# Patient Record
Sex: MALE | Race: WHITE | Employment: FULL TIME | URBAN - NONMETROPOLITAN AREA
[De-identification: names, ages, dates, MRNs, and addresses within clinical notes are randomized per-mention and may not be internally consistent; named-entity substitution may affect disease eponyms.]

---

## 2022-12-21 ENCOUNTER — HOSPITAL ENCOUNTER (INPATIENT)
Age: 27
LOS: 6 days | Discharge: HOME | DRG: 751 | End: 2022-12-27
Attending: PSYCHIATRY & NEUROLOGY | Admitting: PSYCHIATRY & NEUROLOGY
Payer: MEDICAID

## 2022-12-21 PROBLEM — F33.2 MDD (MAJOR DEPRESSIVE DISORDER), RECURRENT SEVERE, WITHOUT PSYCHOSIS (HCC): Status: ACTIVE | Noted: 2022-12-21

## 2022-12-21 PROBLEM — F33.0 MDD (MAJOR DEPRESSIVE DISORDER), RECURRENT EPISODE, MILD (HCC): Status: ACTIVE | Noted: 2022-12-21

## 2022-12-21 PROCEDURE — 6370000000 HC RX 637 (ALT 250 FOR IP): Performed by: PSYCHIATRY & NEUROLOGY

## 2022-12-21 PROCEDURE — APPSS30 APP SPLIT SHARED TIME 16-30 MINUTES: Performed by: PHYSICIAN ASSISTANT

## 2022-12-21 PROCEDURE — 1240000000 HC EMOTIONAL WELLNESS R&B

## 2022-12-21 RX ORDER — ESCITALOPRAM OXALATE 10 MG/1
5 TABLET ORAL DAILY
Status: DISCONTINUED | OUTPATIENT
Start: 2022-12-21 | End: 2022-12-27 | Stop reason: HOSPADM

## 2022-12-21 RX ORDER — ACETAMINOPHEN 325 MG/1
650 TABLET ORAL EVERY 4 HOURS PRN
Status: DISCONTINUED | OUTPATIENT
Start: 2022-12-21 | End: 2022-12-27 | Stop reason: HOSPADM

## 2022-12-21 RX ORDER — HYDROXYZINE HYDROCHLORIDE 25 MG/1
50 TABLET, FILM COATED ORAL 3 TIMES DAILY PRN
Status: DISCONTINUED | OUTPATIENT
Start: 2022-12-21 | End: 2022-12-27 | Stop reason: HOSPADM

## 2022-12-21 RX ORDER — MAGNESIUM HYDROXIDE/ALUMINUM HYDROXICE/SIMETHICONE 120; 1200; 1200 MG/30ML; MG/30ML; MG/30ML
30 SUSPENSION ORAL EVERY 6 HOURS PRN
Status: DISCONTINUED | OUTPATIENT
Start: 2022-12-21 | End: 2022-12-27 | Stop reason: HOSPADM

## 2022-12-21 RX ORDER — TRAZODONE HYDROCHLORIDE 50 MG/1
50 TABLET ORAL NIGHTLY PRN
Status: DISCONTINUED | OUTPATIENT
Start: 2022-12-21 | End: 2022-12-27 | Stop reason: HOSPADM

## 2022-12-21 RX ORDER — IBUPROFEN 400 MG/1
400 TABLET ORAL EVERY 6 HOURS PRN
Status: DISCONTINUED | OUTPATIENT
Start: 2022-12-21 | End: 2022-12-27 | Stop reason: HOSPADM

## 2022-12-21 RX ORDER — NICOTINE 21 MG/24HR
1 PATCH, TRANSDERMAL 24 HOURS TRANSDERMAL DAILY
Status: DISCONTINUED | OUTPATIENT
Start: 2022-12-21 | End: 2022-12-27 | Stop reason: HOSPADM

## 2022-12-21 RX ADMIN — ESCITALOPRAM OXALATE 5 MG: 10 TABLET ORAL at 11:41

## 2022-12-21 ASSESSMENT — LIFESTYLE VARIABLES
HOW OFTEN DO YOU HAVE A DRINK CONTAINING ALCOHOL: 2-4 TIMES A MONTH
HOW MANY STANDARD DRINKS CONTAINING ALCOHOL DO YOU HAVE ON A TYPICAL DAY: 1 OR 2

## 2022-12-21 ASSESSMENT — PATIENT HEALTH QUESTIONNAIRE - PHQ9: SUM OF ALL RESPONSES TO PHQ QUESTIONS 1-9: 15

## 2022-12-21 ASSESSMENT — PAIN SCALES - GENERAL: PAINLEVEL_OUTOF10: 0

## 2022-12-21 ASSESSMENT — SLEEP AND FATIGUE QUESTIONNAIRES
DO YOU USE A SLEEP AID: NO
DO YOU HAVE DIFFICULTY SLEEPING: NO
AVERAGE NUMBER OF SLEEP HOURS: 14

## 2022-12-21 NOTE — PLAN OF CARE
Patient has attended 2 of the groups today and has also been out of his room for social interaction with others this shift so he has been able to demonstrate effective coping strategies at this time.

## 2022-12-21 NOTE — BH NOTE
Group Therapy Note    Date: 12/21/2022  Start Time:  1000  End Time:   1020  Number of Participants: 3    Type of Group: Recreational    Patient's Goal:  Increase wellness. Notes:  Patient participated in a wellness worksheet and shared his answers with others in the group.     Status After Intervention:  Improved    Participation Level: Interactive    Participation Quality: Appropriate, Attentive, and Sharing      Speech:  normal      Thought Process/Content: Logical      Affective Functioning: Congruent      Mood: euthymic      Level of consciousness:  Oriented x4      Response to Learning: Progressing to goal      Endings: None Reported    Modes of Intervention: Education, Support, Socialization, Exploration, and Activity      Discipline Responsible: Psychoeducational Specialist      Signature:  Minh Walden

## 2022-12-21 NOTE — PROGRESS NOTES
Behavioral Health   Admission Note   Ad[facility]    Reason for Admission: \"Having suicidal thoughts for the last week\"    Patient Strengths/Barriers  Strengths (Must Choose Two): Independent living, Support from family, Support from friends  Barriers: Recreational/leisure/hobbies    Addictive Behavior  In the Past 3 Months, Have You Felt or Has Someone Told You That You Have a Problem With  : None    Medical Problems:   History reviewed. No pertinent past medical history. Status EXAM:  Mental Status and Behavioral Exam  Normal: No  Level of Assistance: Independent/Self  Facial Expression: Flat, Sad  Affect: Congruent  Level of Consciousness: Alert  Frequency of Checks: 4 times per hour, close  Mood:Normal: No  Mood: Depressed, Sad  Motor Activity:Normal: Yes  Eye Contact: Fair  Observed Behavior: Cooperative  Sexual Misconduct History: Current - no  Preception: [place] to person, [place] to time, [place] to place, [place] to situation  Attention:Normal: No  Attention: Distractible  Thought Processes: Circumstantial  Thought Content:Normal: No  Thought Content: Paranoia (generalized)  Depression Symptoms: Appetite change, Change in energy level, Feelings of hopelessess, Feelings of worthlessness, Feelings of helplessness  Anxiety Symptoms: No problems reported or observed. Nichol Symptoms: No problems reported or observed. Hallucinations: None  Delusions: Yes  Delusions: Paranoid  Memory:Normal: Yes  Insight and Judgment: No  Insight and Judgment: Poor judgment, Poor insight    Pt admitted with followings belongings:  Dental Appliances: None  Vision - Corrective Lenses: Eyeglasses  Hearing Aid: None  Jewelry: None  Body Piercings Removed: N/A  Clothing: Belt, Footwear, Jacket/Coat, Pants, Shirt, Undergarments, Socks (belt, hoodie, boots, jeans, socks, underpants)  Other Valuables: Money, Wallet ($1 cash, assorted cards)     Admission order obtained Yes  Belongings sent home withn/a.  Valuables placed in safe in security envelope, number:  n/a. Patient's home medications were n/a. Patient oriented to surroundings and program expectations and copy of patient rights given. Received admission packet:  Yes  Consents reviewed, signed Yes. Outcomes Questionnaire completed Refused. \"An Important Message from [employer] About Your Rights\" form reviewed, signed: N/A . Patient verbalize understanding: Yes. Patient informed of 15 minute safety monitoring: YES/NO/NA: yes          Patient screened positive for suicide risk on CSSR-S (\"yes\" to question #4, 5, OR 6)  no N/a. Physician notified of risk score yes  Constant Observer Orders received N/A .   2 person skin assessment completed upon admission declined. Explained patients right to have family, representative or physician notified of their admission. Patient has Declined for physician to be notified. Patient has Declined for family/representative to be notified. Provided pt with [employer] handout entitled \"Quitting Smoking. \"  Reviewed handout with pt addressing dangers of smoking, developing coping skills, and providing basic information about quitting. Pt response to counseling:  declined    Admission summary: Patient arrived to  accompanied by EMT and campus police. Patient [facility]. Per patient he has been feeling increasingly depressed and suicidal for the last week. Patient reports feeling hopeless and helpless \" functioning on auto pillet getting up going to work and not much else. \" Patient denies and one event contributing to his depression but reports his mental health problems started \" about a year ago\". Patient oriented to room and unit, denies additional cares at this time.            Akash Rasmussen RN
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
## 2022-12-21 NOTE — PROGRESS NOTES
Group Therapy Note    Date: 12/21/2022  Start Time: 1330  End Time:  1440  Number of Participants: 4    Type of Group: Psychotherapy      Notes:  Pt is present for group. Peers explored their personal experiences with self will vs acceptance and willingness to accept available help. Each group member explore various defense mechanisms which they had learned to disconnect their head from their heart and how those skills are no longer adaptive or helpful. The group was introduced to pro active vs reactive stress management. Pt identified he being \"overly analytical  \" which he had learned in response to \"emotional trauma. \"    Status After Intervention:  Improved    Participation Level:  Active Listener and Interactive    Participation Quality: Appropriate, Attentive, Sharing, and Supportive      Speech:  normal      Thought Process/Content: Logical. Arrogance       Affective Functioning: Congruent      Mood: euthymic      Level of consciousness:  Alert, Oriented x4, and Attentive      Response to Learning: Able to verbalize current knowledge/experience, Able to verbalize/acknowledge new learning, Able to retain information, Capable of insight, Able to change behavior, and Progressing to goal      Endings: None Reported    Modes of Intervention: Education, Support, Socialization, Exploration, Clarifying, and Activity      Discipline Responsible: /Counselor      Signature:  Tatiana Hodges
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
## 2022-12-21 NOTE — PROGRESS NOTES
Behavioral Services  Medicare Certification Upon Admission    I certify that this patient's inpatient psychiatric hospital admission is medically necessary for:    [x] (1) Treatment which could reasonably be expected to improve this patient's condition,       [x] (2) Or for diagnostic study;     AND     [x](2) The inpatient psychiatric services are provided while the individual is under the care of a physician and are included in the individualized plan of care.     Estimated length of stay/service 3-5 days    Plan for post-hospital care hc    Electronically signed by Grace Hernandez MD on 12/21/2022 at 9:37 AM

## 2022-12-22 PROCEDURE — 1240000000 HC EMOTIONAL WELLNESS R&B

## 2022-12-22 PROCEDURE — APPSS30 APP SPLIT SHARED TIME 16-30 MINUTES: Performed by: PHYSICIAN ASSISTANT

## 2022-12-22 PROCEDURE — 6370000000 HC RX 637 (ALT 250 FOR IP): Performed by: PSYCHIATRY & NEUROLOGY

## 2022-12-22 RX ADMIN — ESCITALOPRAM OXALATE 5 MG: 10 TABLET ORAL at 08:27

## 2022-12-22 RX ADMIN — HYDROXYZINE HYDROCHLORIDE 50 MG: 25 TABLET, FILM COATED ORAL at 02:40

## 2022-12-22 ASSESSMENT — PAIN DESCRIPTION - ORIENTATION: ORIENTATION: LOWER

## 2022-12-22 ASSESSMENT — PAIN DESCRIPTION - PAIN TYPE: TYPE: ACUTE PAIN

## 2022-12-22 ASSESSMENT — PAIN SCALES - GENERAL: PAINLEVEL_OUTOF10: 1

## 2022-12-22 ASSESSMENT — PAIN DESCRIPTION - DESCRIPTORS: DESCRIPTORS: ACHING

## 2022-12-22 ASSESSMENT — PAIN - FUNCTIONAL ASSESSMENT: PAIN_FUNCTIONAL_ASSESSMENT: ACTIVITIES ARE NOT PREVENTED

## 2022-12-22 ASSESSMENT — PAIN DESCRIPTION - FREQUENCY: FREQUENCY: INTERMITTENT

## 2022-12-22 ASSESSMENT — PAIN DESCRIPTION - LOCATION: LOCATION: BACK

## 2022-12-22 NOTE — PROGRESS NOTES
Discharge Planning- Giovanni has appointmetn with Isi Napoles at [facility] on Tuesday Manoj 10 @ 1 pm

## 2022-12-22 NOTE — PLAN OF CARE
Patient has not attended any of the groups so far today and has not been out of his room to socialize with others this shift so he has not been able to demonstrate effective coping strategies at this time. Problem: Coping  Goal: Pt/Family able to verbalize concerns and demonstrate effective coping strategies  12/22/2022 1112 by Eduardo Gooden  Outcome: Not Progressing  12/21/2022 2322 by Aldo Banks RN  Outcome: Progressing  Flowsheets  Taken 12/21/2022 2322  Patient/family able to verbalize anxieties, fears, and concerns, and demonstrate effective coping: Assist patient/family to identify coping skills, available support systems and cultural and spiritual values  Taken 12/21/2022 2306  Patient/family able to verbalize anxieties, fears, and concerns, and demonstrate effective coping: Assist patient/family to identify coping skills, available support systems and cultural and spiritual values  Note: Patient was able to verbalize needs and concerns fairly well.

## 2022-12-22 NOTE — BH NOTE
Group Therapy Note    Date: 12/21/2022  Start Time: 2000  End Time:  2020  Number of Participants: 1    Type of Group: Wrap-Up/Relaxation    Wellness Binder Information  Module Name:  None  Session Number:  None    Patient's Goal:  To not use the brakes at the last minute    Notes:  Ongoing    Status After Intervention:  Unchanged    Participation Level: Interactive    Participation Quality: Resistant      Speech:  hesitant      Thought Process/Content: Linear      Affective Functioning: Blunted      Mood: euthymic      Level of consciousness:  Alert      Response to Learning: Able to retain information      Endings: None Reported    Modes of Intervention: Education      Discipline Responsible: Registered Nurse      Signature:   Magda Schneider RN

## 2022-12-22 NOTE — PATIENT CARE CONFERENCE
[address] [facility]  Initial Interdisciplinary Treatment Plan NOTE    Review Date & Time: 12/22/22 831    Patient was in treatment team.  See Multidisciplinary Treatment Team sheet for participants. Admission Type:   Admission Type: Involuntary [facility])    Reason for admission:  Reason for Admission: \"Having suicidal thoughts for the last week\"      Estimated Length of Stay Update:  3-5 days   Estimated Discharge Date Update: 2-4 days     Patient Strengths/Barriers  Strengths (Must Choose Two): Independent living, Support from family, Support from friends  Barriers: Recreational/leisure/hobbies  Addictive Behavior:Addictive Behavior  In the Past 3 Months, Have You Felt or Has Someone Told You That You Have a Problem With  : None  Medical Problems:History reviewed. No pertinent past medical history. EDUCATION:   Learner Progress Toward Treatment Goals: Reviewed results and recommendations of this team, Reviewed group plan and strategies, Reviewed signs, symptoms and risk of self harm and violent behavior, and Reviewed goals and plan of care    Method: Individual    Outcome: Verbalized understanding and Demonstrated Understanding    PATIENT GOALS: I want to feel normal and for the suicidal thoughts to stop. PLAN/TREATMENT RECOMMENDATIONS UPDATE:   What is the most important thing we can help you with while you are here? See above  Who is your support system? Sister and [place] you have follow-up providers? [facility]  Do you have the ability to pay for your medications? Yes  Where will you be residing when you leave the hospital? At home   Will need a return to work slip or FMLA paper completion?  Yes       GOALS UPDATE:   Time frame for Short-Term Goals: ongoing    [facility], [facility] [address]

## 2022-12-22 NOTE — PROGRESS NOTES
Department of Psychiatry  Progress Note     Chief Complaint:  Suicidal ideation with plan to shoot self    PROGRESS:  Giovanni presents to the interview room. He states he did not sleep well last night because he drank 7 cups of coffee yesterday to stay awake so he could participate in the groups and unit milieu. He reports his mood is okay today. He continues to feel depressed. He endorses fleeting suicidal thoughts but states they are not as heavy today. He denies any specific plan or intent and contracts for safety on the unit. He endorses feeling hopeless and helpless about his situation. Staff reported he slept 6 hours broken last night. He has been eating well on the unit. He has been compliant with his Lexapro. He states that upset his stomach yesterday. He was encouraged that this should go away within a few days of taking the medication consistently. He verbalized understanding. He did attend groups yesterday but has not attended any groups so far today. He has been sleeping most of the day today per staff.     Suicidal ideations: Fleeting without current plan or intent  Compliance with medications: good   Medication side effects: Stomachache  ROS: Patient has new complaints:  no  Sleep quality: 6 hours broken last night per staff  Attending groups: None today      OBJECTIVE      Medications  Current Facility-Administered Medications: acetaminophen (TYLENOL) tablet 650 mg, 650 mg, Oral, Q4H PRN  ibuprofen (ADVIL;MOTRIN) tablet 400 mg, 400 mg, Oral, Q6H PRN  hydrOXYzine HCl (ATARAX) tablet 50 mg, 50 mg, Oral, TID PRN  traZODone (DESYREL) tablet 50 mg, 50 mg, Oral, Nightly PRN  magnesium hydroxide (MILK OF MAGNESIA) 400 MG/5ML suspension 30 mL, 30 mL, Oral, Daily PRN  aluminum & magnesium hydroxide-simethicone (MAALOX) 200-200-20 MG/5ML suspension 30 mL, 30 mL, Oral, Q6H PRN  nicotine (NICODERM CQ) 14 MG/24HR 1 patch, 1 patch, TransDERmal, Daily  escitalopram (LEXAPRO) tablet 5 mg, 5 mg, Oral, Daily Physical     height is 5' 10\" (1.778 m) and weight is 274 lb (124.3 kg). His tympanic temperature is 96.4 °F (35.8 °C) (abnormal). His blood pressure is 129/62 and his pulse is 74. His respiration is 18 and oxygen saturation is 99%. No results found for: WBC, HGB, HCT, PLT, CHOL, TRIG, HDL, LDLDIRECT, ALT, AST, NA, K, CL, CREATININE, BUN, CO2, TSH, PSA, INR, GLUF, LABA1C, LABMICR       Mental Status Exam:   Level of consciousness:  awake  Appearance:  well-appearing, hospital attire, in chair, good grooming, and good hygiene  Behavior/Motor:  no abnormalities noted  Attitude toward examiner:  cooperative, attentive, and good eye contact  Speech:  spontaneous, normal rate, and normal volume  Mood: Okay per patient  Affect:  blunted  Thought processes:  linear, goal directed, and coherent  Thought content:  Denies homicidal ideation  Suicidal Ideation: Fleeting, without plan, and without intent  Delusions:  no evidence of delusions  Perceptual Disturbance:  denies any perceptual disturbance  Cognition: Patient is oriented to person, place, time and situation  Concentration: clinically adequate  Memory: intact  Insight & Judgement: fair       ASSESSMENT     MDD (major depressive disorder), recurrent severe, without psychosis ([facility] [address]   History of ADD  Cannabis use disorder, moderate  History of hallucinogenic abuse    PLAN    Patient's symptoms show minimal improvement today  Medication adjustments as discussed with the attending physician: Continue Lexapro as prescribed  Attempt to develop insight, psycho-education and supportive therapy conducted. Probable discharge: To be determined  Follow-up: [facility] outpatient, daily while on inpatient unit    Electronically signed by Janace Bumpers, PA-C on 12/22/2022 at 12:44 PM Reviewed patient's current plan of care and vital signs with nursing staff. **This report has been created using voice recognition software.  It may contain minor errors which are inherent in voice recognition technology. **                                      Psychiatry Attending Attestation     I assessed this patient and reviewed the case and plan of care with Lovette Dance, PA-C. I have reviewed the above documentation and I agree with the findings and treatment plan with the following updates. Patient reports that his mood is largely unchanged. Parents report feeling sad on and low. Reports that he tried to keep himself up by drinking excess caffeine yesterday. Reports that suicidal thoughts across his mind. He reports that he continues to feel helpless and hopeless    PLAN  Patient s symptoms   show no change  Continue same medication today and observe  Attempt to develop insight  Psycho-education conducted. Supportive Therapy conducted. Probable discharge is TBD   Follow-up TBD    Patient was evaluated by Lovette Dance, PA-C on the unit in person and I evaluated patient as Tele visit. This Virtual Visit was conducted with patient's consent. The patient is located in a state where I am licensed to provide care. Claritza Dobbins is a 28 y.o. male being evaluated by a Virtual Visit (video visit) encounter to address concerns as mentioned above. A caregiver was present in the room along with the patient. Patient is present at [address] [place] [address] and I am physically present at my home in [facility]     --Kvng Mckeon MD on 12/22/2022 at 2:09 PM    An electronic signature was used to authenticate this note. **This report has been created using voice recognition software. It may contain minor errors which are inherent in voice recognition technology. **

## 2022-12-22 NOTE — PROGRESS NOTES
Psychosocial Assessment    Current Level of Psychosocial Functioning     Independent                                 XXXX  Dependent    Minimal Assist     Comments:      Psychosocial High Risk Factors (check all that apply)    Unable to obtain meds   Chronic illness/pain    Substance abuse                                         XXX  Lack of Family Support   Financial stress   Isolation                                                            XXX  Inadequate Community Resources  Suicide attempt(s)  Not taking medications   Victim of crime   Developmental Delay  Unable to manage personal needs    Age 72 or older   Homeless  No transportation   Readmission within 30 days  Unemployment  Traumatic Event    Family/Supports identified: Sister and Franki Olivo    Sexual Orientation:  Heterosexual     Patient Strengths: Support, employment     Patient Barriers: isolation     Safety plan: ongoing    CMHC/MH history:    XXX    Plan of Care:  medication management, group/individual therapies, family meetings, psycho -education, treatment team meetings to assist with stabilization    Initial Discharge Plan:  Reconnect with [facility]     Clinical Summary:  Patient is 28year old male who presented to the ED for suicidal thoughts. Patient currently lives alone and works full-time. Patient shared that he has support through his sister and a friend. Patient shared that he used to be seen at [facility] before he stopped going to counseling a few months ago. Patient shared that he uses Marijuana and alcohol on a regular basis. Patient endorses suicidal thoughts and expressed that he wishes they would stop. Patient denies HI and AH/VH.

## 2022-12-22 NOTE — PLAN OF CARE
Problem: Self Harm/Suicidality  Goal: Will have no self-injury during hospital stay  Description: INTERVENTIONS:  1. Ensure constant observer at bedside with Q15M safety checks  2. Maintain a safe environment  3. Secure patient belongings  4. Ensure family/visitors adhere to safety recommendations  5. Ensure safety tray has been added to patient's diet order  6. Every shift and PRN: Re-assess suicidal risk via Frequent Screener    Outcome: Progressing  Flowsheets  Taken 12/21/2022 2322 by Jarad Ventura RN  Will have no self-injury during hospital stay: Maintain a safe environment  Taken 12/21/2022 2306 by Jarad Ventura RN  Will have no self-injury during hospital stay: Maintain a safe environment  Taken 12/21/2022 1102 by Judie Em RN  Will have no self-injury during hospital stay: Ensure constant observer at bedside with Q15M safety checks  Note: No self harm noted so far this shift. Patient denies any suicidal thoughts at present. Problem: Depression  Goal: Will be euthymic at discharge  Description: INTERVENTIONS:  1. Administer medication as ordered  2. Provide emotional support via 1:1 interaction with staff  3. Encourage involvement in milieu/groups/activities  4. Monitor for social isolation  Outcome: Progressing  Note: Patient denies any feelings of depression at present. Patient noted with a blunt affect and was isolative to his room. Problem: Behavior  Goal: Pt/Family maintain appropriate behavior and adhere to behavioral management agreement, if implemented  Description: INTERVENTIONS:  1. Assess patient/family's coping skills and  non-compliant behavior (including use of illegal substances)  2. Notify security of behavior or suspected illegal substances which indicate the need for search of the family and/or belongings  3. Encourage verbalization of thoughts and concerns in a socially appropriate manner  4.  Utilize positive, consistent limit setting strategies supporting safety of patient, staff and others  5. Encourage participation in the decision making process about the behavioral management agreement  6. If a visitor's behavior poses a threat to safety call refer to organization policy. 7. Initiate consult with , Psychosocial CNS, Spiritual Care as appropriate  Outcome: Progressing  Flowsheets  Taken 12/21/2022 2322 by Jayjay John RN  Patient/family maintains appropriate behavior and adheres to behavioral management agreement, if implemented: Assess patient/familys coping skills and  non-compliant behavior (including use of illegal substances)  Taken 12/21/2022 2306 by Jayjay John RN  Patient/family maintains appropriate behavior and adheres to behavioral management agreement, if implemented: Assess patient/familys coping skills and  non-compliant behavior (including use of illegal substances)  Taken 12/21/2022 1102 by Rohit March RN  Patient/family maintains appropriate behavior and adheres to behavioral management agreement, if implemented: Assess patient/familys coping skills and  non-compliant behavior (including use of illegal substances)  Note: Patient was cooperative with his assessment. Problem: Anxiety  Goal: Will report anxiety at manageable levels  Description: INTERVENTIONS:  1. Administer medication as ordered  2. Teach and rehearse alternative coping skills  3. Provide emotional support with 1:1 interaction with staff  Outcome: Progressing  Flowsheets  Taken 12/21/2022 2322 by Jayjay John RN  Will report anxiety at manageable levels: Teach and rehearse alternative coping skills  Taken 12/21/2022 2306 by Jayjay John RN  Will report anxiety at manageable levels: Teach and rehearse alternative coping skills  Taken 12/21/2022 1102 by Rohit March RN  Will report anxiety at manageable levels: Teach and rehearse alternative coping skills  Note: Patient denies any anxiety at present.      Problem: Drug Abuse/Detox  Goal: Will have no detox symptoms and will verbalize plan for changing drug-related behavior  Description: INTERVENTIONS:  1. Administer medication as ordered  2. Monitor physical status  3. Provide emotional support with 1:1 interaction with staff  4. Encourage  recovery focused treatment   Outcome: Progressing  Flowsheets  Taken 12/21/2022 2322 by Magda Schneider RN  Will have no detox symptoms and will verbalize plan for changing drug-related behavior: Monitor physical status  Taken 12/21/2022 2306 by Magda Schneider RN  Will have no detox symptoms and will verbalize plan for changing drug-related behavior: Monitor physical status  Taken 12/21/2022 1102 by Michel Wade RN  Will have no detox symptoms and will verbalize plan for changing drug-related behavior: Monitor physical status  Note: Patient denies any detox symptoms so far this shift. Problem: Sleep Disturbance  Goal: Will exhibit normal sleeping pattern  Description: INTERVENTIONS:  1. Administer medication as ordered  2. Decrease environmental stimuli, including noise, as appropriate  3. Discourage social isolation and naps during the day  Outcome: Progressing  Note: Patient states he has been sleeping more recently. Problem: Involuntary Admit  Goal: Will cooperate with staff recommendations and doctor's orders and will demonstrate appropriate behavior  Description: INTERVENTIONS:  1. Treat underlying conditions and offer medication as ordered  2. Educate regarding involuntary admission procedures and rules  3.  Contain excessive/inappropriate behavior per unit and hospital policies  Outcome: Progressing  Flowsheets  Taken 12/21/2022 2322 by Magda Schneider RN  Will cooperate with staff recommendations and doctor's orders and will demonstrate appropriate behavior: Educate regarding involuntary admission procedures and rules  Taken 12/21/2022 2306 by Magda Schneider RN  Will cooperate with staff recommendations and doctor's orders and will demonstrate appropriate behavior: Treat underlying conditions and offer medication as ordered  Taken 12/21/2022 1102 by Joss Weinberg, RN  Will cooperate with staff recommendations and doctor's orders and will demonstrate appropriate behavior: Treat underlying conditions and offer medication as ordered  Note: Patient was cooperative with care this shift. Problem: Discharge Planning  Goal: Discharge to home or other facility with appropriate resources  Outcome: Progressing  Flowsheets (Taken 12/21/2022 2322)  Discharge to home or other facility with appropriate resources: Identify barriers to discharge with patient and caregiver  Note: Patient states he will return home alone at discharge and continue to follow with [facility]. Problem: Risk for Elopement  Goal: Patient will not exit the unit/facility without proper excort  Outcome: Progressing  Flowsheets  Taken 12/21/2022 2322 by Deidre Thomas RN  Nursing Interventions for Elopement Risk:   Make sure patient has all necessary personal care items   Reduce environmental triggers  Taken 12/21/2022 2306 by Deidre Thomas RN  Nursing Interventions for Elopement Risk:   Make sure patient has all necessary personal care items   Reduce environmental triggers  Taken 12/21/2022 1102 by Joss Weinberg, RN  Nursing Interventions for Elopement Risk:   Make sure patient has all necessary personal care items   Reduce environmental triggers  Taken 12/21/2022 1048 by Joss Weinberg, RN  Nursing Interventions for Elopement Risk:   Make sure patient has all necessary personal care items   Reduce environmental triggers  Note: No elopement attempts noted so far this shift. Problem: Coping  Goal: Pt/Family able to verbalize concerns and demonstrate effective coping strategies  Description: INTERVENTIONS:  1. Assist patient/family to identify coping skills, available support systems and cultural and spiritual values  2.  Provide emotional support, including active listening and acknowledgement of concerns of patient and caregivers  3. Reduce environmental stimuli, as able  4. Instruct patient/family in relaxation techniques, as appropriate  5. Assess for spiritual pain/suffering and initiate Spiritual Care, Psychosocial Clinical Specialist consults as needed  12/21/2022 2322 by Manohar Whittaker RN  Outcome: Progressing  Flowsheets  Taken 12/21/2022 2322  Patient/family able to verbalize anxieties, fears, and concerns, and demonstrate effective coping: Assist patient/family to identify coping skills, available support systems and cultural and spiritual values  Taken 12/21/2022 2306  Patient/family able to verbalize anxieties, fears, and concerns, and demonstrate effective coping: Assist patient/family to identify coping skills, available support systems and cultural and spiritual values  Note: Patient was able to verbalize needs and concerns fairly well.  12/21/2022 1352 by Sherice Ocampo  Outcome: Progressing   Care plan reviewed with patient.   Patient does verbalize understanding of the plan of care and does contribute to goal setting

## 2022-12-22 NOTE — PLAN OF CARE
Problem: Self Harm/Suicidality  Goal: Will have no self-injury during hospital stay  Description: INTERVENTIONS:  1. Ensure constant observer at bedside with Q15M safety checks  2. Maintain a safe environment  3. Secure patient belongings  4. Ensure family/visitors adhere to safety recommendations  5. Ensure safety tray has been added to patient's diet order  6. Every shift and PRN: Re-assess suicidal risk via Frequent Screener    12/22/2022 1314 by González Mo RN  Outcome: Progressing  Flowsheets (Taken 12/22/2022 0745)  Will have no self-injury during hospital stay: Maintain a safe environment  Note: Free from self harming behaviors. Denies suicidal thoughts. Problem: Depression  Goal: Will be euthymic at discharge  Description: INTERVENTIONS:  1. Administer medication as ordered  2. Provide emotional support via 1:1 interaction with staff  3. Encourage involvement in milieu/groups/activities  4. Monitor for social isolation  12/22/2022 1314 by González Mo RN  Outcome: Progressing  Note: Rates his mood 6 out of 10 with 10 being the best mood. Problem: Behavior  Goal: Pt/Family maintain appropriate behavior and adhere to behavioral management agreement, if implemented  Description: INTERVENTIONS:  1. Assess patient/family's coping skills and  non-compliant behavior (including use of illegal substances)  2. Notify security of behavior or suspected illegal substances which indicate the need for search of the family and/or belongings  3. Encourage verbalization of thoughts and concerns in a socially appropriate manner  4. Utilize positive, consistent limit setting strategies supporting safety of patient, staff and others  5. Encourage participation in the decision making process about the behavioral management agreement  6. If a visitor's behavior poses a threat to safety call refer to organization policy.   7. Initiate consult with , Psychosocial CNS, Spiritual Care as appropriate  12/22/2022 1314 by Deepika Pal RN  Outcome: Progressing  Flowsheets (Taken 12/22/2022 0745)  Patient/family maintains appropriate behavior and adheres to behavioral management agreement, if implemented: Encourage verbalization of thoughts and concerns in a socially appropriate manner  Note: Patient having appropriate behaviors so far this shift. Problem: Anxiety  Goal: Will report anxiety at manageable levels  Description: INTERVENTIONS:  1. Administer medication as ordered  2. Teach and rehearse alternative coping skills  3. Provide emotional support with 1:1 interaction with staff  12/22/2022 1314 by Deepika Pal RN  Outcome: Progressing  Flowsheets (Taken 12/22/2022 0745)  Will report anxiety at manageable levels: Provide emotional support with 1:1 interaction with staff     Problem: Drug Abuse/Detox  Goal: Will have no detox symptoms and will verbalize plan for changing drug-related behavior  Description: INTERVENTIONS:  1. Administer medication as ordered  2. Monitor physical status  3. Provide emotional support with 1:1 interaction with staff  4. Encourage  recovery focused treatment   12/22/2022 1314 by Deepika Pal RN  Outcome: Progressing  Flowsheets (Taken 12/22/2022 0745)  Will have no detox symptoms and will verbalize plan for changing drug-related behavior: Provide emotional support with 1:1 interaction with staff  Note: Denies detox s/s. Problem: Sleep Disturbance  Goal: Will exhibit normal sleeping pattern  Description: INTERVENTIONS:  1. Administer medication as ordered  2. Decrease environmental stimuli, including noise, as appropriate  3. Discourage social isolation and naps during the day  12/22/2022 1314 by Deepika Pal RN  Outcome: Progressing  Note: Patient slept 6 hours broken last night.       Problem: Involuntary Admit  Goal: Will cooperate with staff recommendations and doctor's orders and will demonstrate appropriate Progressing  Flowsheets (Taken 12/22/2022 0745)  Patient/family able to verbalize anxieties, fears, and concerns, and demonstrate effective coping: Provide emotional support, including active listening and acknowledgement of concerns of patient and caregivers     Problem: Coping  Goal: Pt/Family able to verbalize concerns and demonstrate effective coping strategies  Description: INTERVENTIONS:  1. Assist patient/family to identify coping skills, available support systems and cultural and spiritual values  2. Provide emotional support, including active listening and acknowledgement of concerns of patient and caregivers  3. Reduce environmental stimuli, as able  4. Instruct patient/family in relaxation techniques, as appropriate  5.  Assess for spiritual pain/suffering and initiate Spiritual Care, Psychosocial Clinical Specialist consults as needed  12/22/2022 1314 by Angelina Britton RN  Outcome: Progressing  Flowsheets (Taken 12/22/2022 0745)  Patient/family able to verbalize anxieties, fears, and concerns, and demonstrate effective coping: Provide emotional support, including active listening and acknowledgement of concerns of patient and caregivers  12/22/2022 1112 by Minh Walden  Outcome: Not Progressing  Flowsheets (Taken 12/22/2022 0745 by Angelina Britton RN)  Patient/family able to verbalize anxieties, fears, and concerns, and demonstrate effective coping: Provide emotional support, including active listening and acknowledgement of concerns of patient and caregivers  12/21/2022 2322 by Enmanuel Don RN  Outcome: Progressing  Flowsheets  Taken 12/21/2022 2322  Patient/family able to verbalize anxieties, fears, and concerns, and demonstrate effective coping: Assist patient/family to identify coping skills, available support systems and cultural and spiritual values  Taken 12/21/2022 2306  Patient/family able to verbalize anxieties, fears, and concerns, and demonstrate effective coping: Assist patient/family to identify coping skills, available support systems and cultural and spiritual values  Note: Patient was able to verbalize needs and concerns fairly well. Care plan reviewed with patient.   Patient does verbalize understanding of the plan of care and does contribute to goal setting

## 2022-12-23 PROCEDURE — 6370000000 HC RX 637 (ALT 250 FOR IP): Performed by: PSYCHIATRY & NEUROLOGY

## 2022-12-23 PROCEDURE — 1240000000 HC EMOTIONAL WELLNESS R&B

## 2022-12-23 RX ADMIN — ESCITALOPRAM OXALATE 5 MG: 10 TABLET ORAL at 08:33

## 2022-12-23 ASSESSMENT — PAIN SCALES - GENERAL
PAINLEVEL_OUTOF10: 0

## 2022-12-23 ASSESSMENT — PAIN - FUNCTIONAL ASSESSMENT: PAIN_FUNCTIONAL_ASSESSMENT: ACTIVITIES ARE NOT PREVENTED

## 2022-12-23 NOTE — GROUP NOTE
Group Therapy Note    Date: 12/23/2022    Group Start Time: 1025  Group End Time: 1108  Group Topic: Psychotherapy    [facility] 4E    CHAMP Jaramillo        Group Therapy Note: Today group discussed topics of negative thinking, coping skills stress. They discussed ways to help negative thinking. Talked about how stress effects them. Then discussed how they handle their stress and ways to do this better in the future. Attendees: 2       Patient's Goal:  process mental health     Notes:  Patient was present and fully participated in group discussion. Patient talked about using escape a lot and that's what brought him here. Patient was supportive of other group members. Status After Intervention:  Improved    Participation Level:  Active Listener and Interactive    Participation Quality: Appropriate, Attentive, Sharing, and Supportive      Speech:  normal      Thought Process/Content: Logical      Affective Functioning: Congruent      Mood: depressed      Level of consciousness:  Alert, Oriented x4, and Attentive      Response to Learning: Able to verbalize current knowledge/experience, Able to verbalize/acknowledge new learning, Able to retain information, Capable of insight, Able to change behavior, and Progressing to goal      Endings: None Reported    Modes of Intervention: Exploration      Discipline Responsible: /Counselor      Signature:  Isabell De

## 2022-12-23 NOTE — PLAN OF CARE
Patient has attended all of the groups today and has also been out of his room to watch TV and socialize with others so he has been able to demonstrate effective coping strategies at this time.

## 2022-12-23 NOTE — PROGRESS NOTES
1:1- Patient requested a \"counseling\" session. Patient shared that he is on a break here and what happens when he goes back home and has to press play and is faced with all the things that end him up here. Patient started talking about wanting to go to school but is overly worried the absolute of dept and not an absolute of finding and being in a job that he will enjoy. He talked about know he will work until he dies and that this is a bleek existence. He doesn't want to live in the life that he has especially with the way society is and thing that he see and hears. This writer talked with patient about needing to create a life that he wants to be in despite the world around him. Encouraged him to start with the small things that he can change that will have a high success rate so that he get the confidence to handle the  bigger things. Talked about stopping his negative thoughts and turning into more positive thinking. Talked about potentially doing school a few classes at a time to help him with not feeling a big financial burden.

## 2022-12-23 NOTE — PATIENT CARE CONFERENCE
[facility]  Day 3 Interdisciplinary Treatment Plan NOTE    Review Date & Time: 12/23/22 858    Patient was in treatment team    Admission Type:   Admission Type: Involuntary [facility])    Reason for admission:  Reason for Admission: \"Having suicidal thoughts for the last week\"  Estimated Length of Stay Update:  3-5 days  Estimated Discharge Date Update: 1-3 days     Patient Strengths/Barriers  Strengths (Must Choose Two): Independent living, Support from family, Support from friends  Barriers: Recreational/leisure/hobbies  Addictive Behavior:Addictive Behavior  In the Past 3 Months, Have You Felt or Has Someone Told You That You Have a Problem With  : None  Medical Problems:History reviewed. No pertinent past medical history. Risk:  Fall Risk   Nolan Scale Nolan Scale Score: 22    Status EXAM:   Mental Status and Behavioral Exam  Normal: Yes  Level of Assistance: Independent/Self  Facial Expression: Flat, Brightened  Affect: Appropriate  Level of Consciousness: Alert  Frequency of Checks: 4 times per hour, close  Mood:Normal: No  Mood: Anxious  Motor Activity:Normal: Yes  Motor Activity: Decreased  Eye Contact: Fair  Observed Behavior: Cooperative, Friendly  Sexual Misconduct History: Current - no  Preception: [place] to person, [place] to time, [place] to place, [place] to situation  Attention:Normal: Yes  Attention: Distractible  Thought Processes: Circumstantial  Thought Content:Normal: Yes  Thought Content: Paranoia (generalized)  Depression Symptoms: Change in energy level, Feelings of hopelessess, Sleep disturbance, Loss of interest  Anxiety Symptoms: Generalized  Nichol Symptoms: No problems reported or observed.   Hallucinations: None  Delusions: No  Delusions: Paranoid  Memory:Normal: Yes  Insight and Judgment: No  Insight and Judgment: Poor judgment, Poor insight, Unrealistic    Daily Assessment Last Entry:   Daily Sleep (WDL): Within Defined Limits            Daily Nutrition (WDL): Within Defined Limits  Level of Assistance: Independent/Self    Patient Monitoring:  Frequency of Checks: 4 times per hour, close    Psychiatric Symptoms:   Depression Symptoms  Depression Symptoms: Change in energy level, Feelings of hopelessess, Sleep disturbance, Loss of interest  Anxiety Symptoms  Anxiety Symptoms: Generalized  Nichol Symptoms  Nichol Symptoms: No problems reported or observed. Suicide Risk CSSR-S:  1) Within the past month, have you wished you were dead or wished you could go to sleep and not wake up? : Yes  2) Have you actually had any thoughts of killing yourself? : No  6) Have you ever done anything, started to do anything, or prepared to do anything to end your life?: No    EDUCATION:   Learner Progress Toward Treatment Goals: Reviewed results and recommendations of this team, Reviewed group plan and strategies, Reviewed signs, symptoms and risk of self harm and violent behavior, and Reviewed goals and plan of care    Method: Individual    Outcome: Verbalized understanding and Demonstrated Understanding    PATIENT GOALS: I want to feel normal, for suicidal thoughts to go away     PLAN/TREATMENT RECOMMENDATIONS UPDATE:  How are you progressing toward meeting your main treatment goal? I am feeling better then when I came in but I still feel like I have a ways to go   2. Are there discharge barriers/lingering problems that need to be addressed? No       3. Do you have the ability to pay for your medications? Yes       4. How is your group participation? \"There were no groups yesterday\" patient headed to group as we finished.      GOALS UPDATE:   Time frame for Short-Term Goals: ongoing      [facility], [address]

## 2022-12-23 NOTE — BH NOTE
Group Therapy Note    Date: 12/23/2022  Start Time:  0915  End Time:   1652  Number of Participants: 2    Type of Group: Recreational    Patient's Goal:  Increase self-esteem. Notes:  Patient participated in a self-esteem worksheet and shared his answers with others in the group.      Status After Intervention:  Improved    Participation Level: Interactive    Participation Quality: Appropriate, Attentive, and Sharing      Speech:  normal      Thought Process/Content: Logical      Affective Functioning: Congruent      Mood: euthymic      Level of consciousness:  Oriented x4      Response to Learning: Progressing to goal      Endings: None Reported    Modes of Intervention: Education, Support, Socialization, Exploration, and Activity      Discipline Responsible: Psychoeducational Specialist      Signature:  Alonzo Lr

## 2022-12-23 NOTE — PROGRESS NOTES
Department of Psychiatry  Progress Note     Chief Complaint:  Suicidal ideation with plan to shoot self    PROGRESS:  Giovanni presents to the interview room. He reports he is feeling alright today. He states he got an okay about of sleep last night. He is trying to stay awake today to attend groups and participate in the unit milieu. He wants to be able to drink more caffeine to achieve this. He wants to get a qualitative assessment on his medications. Milo Griggs continues to feel depressed. He states he keeps getting hung up on what happens when he leaves here. He feels that being admitted has allowed him to momentarily step away from his life but he is concerned the feelings for him to come back when he returns to his daily life. He does not feel the environment he is in currently is the best situation for him. He endorses fleeting suicidal thoughts but denies any specific plan or intent and contracts for safety on the unit. He states he had a few thoughts yesterday. He mentions that he has identified a trigger for his suicidal thoughts as his negative self perception. He states he has to change how he views himself. He reports he can try to physically change himself by losing weight and practicing better grooming habits. Patient also mentioned that he feels he does not have much of a personality and does not have many hobbies. He wants to change this as well. He endorses feeling hopeless and helpless about his situation. Staff reported he slept 6 hours broken last night. He reports he got up once during the night to use the restroom and was up for 2 hours after that. He has been eating well on the unit. He has been compliant with his Lexapro. He denies any side effects at this time. He has been on the unit interacting with peers and attending groups. Patient feels he would benefit significantly from one-on-one counseling.   [facility]  did sit down with him today to discuss his concerns. Per Catherine's note: \"Patient shared that he is on a break here and what happens when he goes back home and has to press play and is faced with all the things that end him up here. Patient started talking about wanting to go to school but is overly worried the absolute of dept and not an absolute of finding and being in a job that he will enjoy. He talked about know he will work until he dies and that this is a bleek existence. He doesn't want to live in the life that he has especially with the way society is and thing that he see and hears. This writer talked with patient about needing to create a life that he wants to be in despite the world around him. Encouraged him to start with the small things that he can change that will have a high success rate so that he get the confidence to handle the  bigger things. Talked about stopping his negative thoughts and turning into more positive thinking. Talked about potentially doing school a few classes at a time to help him with not feeling a big financial burden. \"     Suicidal ideations: Fleeting without current plan or intent  Compliance with medications: good   Medication side effects: No   ROS: Patient has new complaints:  no  Sleep quality: 6 hours broken last night per staff  Attending groups: Yes      OBJECTIVE      Medications  Current Facility-Administered Medications: acetaminophen (TYLENOL) tablet 650 mg, 650 mg, Oral, Q4H PRN  ibuprofen (ADVIL;MOTRIN) tablet 400 mg, 400 mg, Oral, Q6H PRN  hydrOXYzine HCl (ATARAX) tablet 50 mg, 50 mg, Oral, TID PRN  traZODone (DESYREL) tablet 50 mg, 50 mg, Oral, Nightly PRN  magnesium hydroxide (MILK OF MAGNESIA) 400 MG/5ML suspension 30 mL, 30 mL, Oral, Daily PRN  aluminum & magnesium hydroxide-simethicone (MAALOX) 200-200-20 MG/5ML suspension 30 mL, 30 mL, Oral, Q6H PRN  nicotine (NICODERM CQ) 14 MG/24HR 1 patch, 1 patch, TransDERmal, Daily  escitalopram (LEXAPRO) tablet 5 mg, 5 mg, Oral, Daily     Physical height is 5' 10\" (1.778 m) and weight is 274 lb (124.3 kg). His oral temperature is 96.9 °F (36.1 °C). His blood pressure is 121/74 and his pulse is 75. His respiration is 16 and oxygen saturation is 97%. No results found for: WBC, HGB, HCT, PLT, CHOL, TRIG, HDL, LDLDIRECT, ALT, AST, NA, K, CL, CREATININE, BUN, CO2, TSH, PSA, INR, GLUF, LABA1C, LABMICR       Mental Status Exam:   Level of consciousness:  awake  Appearance:  well-appearing, hospital attire, in chair, good grooming, and good hygiene  Behavior/Motor:  no abnormalities noted  Attitude toward examiner:  cooperative, attentive, and good eye contact  Speech:  spontaneous, normal rate, and normal volume  Mood: Alright per patient  Affect:  blunted  Thought processes:  linear, goal directed, and coherent  Thought content:  Denies homicidal ideation  Suicidal Ideation: Fleeting, without plan, and without intent  Delusions:  no evidence of delusions  Perceptual Disturbance:  denies any perceptual disturbance  Cognition: Patient is oriented to person, place, time and situation  Concentration: clinically adequate  Memory: intact  Insight & Judgement: fair       ASSESSMENT     MDD (major depressive disorder), recurrent severe, without psychosis ([facility] [address]   History of ADD  Cannabis use disorder, moderate  History of hallucinogenic abuse    PLAN    Patient's symptoms show minimal improvement today  Medication adjustments as discussed with the attending physician: Continue Lexapro as prescribed  Attempt to develop insight, psycho-education and supportive therapy conducted. Probable discharge: To be determined  Follow-up: [facility] outpatient, daily while on inpatient unit    Electronically signed by Patsy Gambino PA-C on 12/23/2022 at 12:03 PM Reviewed patient's current plan of care and vital signs with nursing staff. Yodit Carlos is a 28 y.o. male being evaluated by a Virtual Visit (video visit) encounter to address concerns as mentioned above.   CELINA caregiver was present in the room along with the patient. Pursuant to the emergency declaration under the [facility], [address] [place] [address] and the [employer] [facility] and [employer] Act, this Virtual Visit was conducted with patient's (and/or legal guardian's) consent, to reduce the patient's risk of exposure to COVID-19 and provide necessary medical care. Services were provided through a video synchronous discussion virtually to substitute for in-person visit by provider. Patient is present at [address] [address] on [address] 4[address] and I am physically present at my home in \[facility] [place] [facility]\""    **This report has been created using voice recognition software. It may contain minor errors which are inherent in voice recognition technology. **                                      Psychiatry Attending Attestation     I assessed this patient and reviewed the case and plan of care with Rosy Ramirez PA-C. I have reviewed the above documentation and I agree with the findings and treatment plan with the following updates. Patient was that he continues to feel helpless and hopeless. Reports that he is trying to get back to regular sleep cycle here on the unit. Reports that he will be able to utilize some caffeine here. Explored some of the cognitive distortions he has been facing with. Reports no suicidal thoughts still cross his mind a few times yesterday. Unable to fully contract to safety outside of hospital.  He did realize that he is in a controlled environment and is worried on how his interaction, with the society, will be after he leaves the hospital.    PLAN  Patient s symptoms   show no change  Continue same medication today and observe  Attempt to develop insight  Psycho-education conducted. Supportive Therapy conducted. Probable discharge is TBD   Follow-up TBD    This Virtual Visit was conducted with patient's consent.  The patient is located in a state where I am licensed to provide care. Willie Aleman is a 28 y.o. male being evaluated by a Virtual Visit (video visit) encounter to address concerns as mentioned above. A caregiver was present in the room along with the patient. Patient is present at [address] [place] [address] and I am physically present at my home in [address]     Electronically signed by Shilo Sow MD on 12/23/2022 at 12:24 PM    An electronic signature was used to authenticate this note. **This report has been created using voice recognition software. It may contain minor errors which are inherent in voice recognition technology. **

## 2022-12-23 NOTE — PROGRESS NOTES
Topic purpose and benefits of Emotions Anonymous (12 step fellowship)    Start 1630    End 1700    Participation Pt verbally participated he appropriately passed when reading a statement with \"God\" in the statement      Response active       Behavior  appropriate with verbal response and volunteered to read from the handout

## 2022-12-23 NOTE — BH NOTE
PLAN OF CARE:     Start Time: 0900  End Time:  0915    Group Topic:  Daily Goals    Group Type:   Goal Group    Intervention/Goal:  To increase support and identify daily goals    Attendance:  attended      Affect:  brightens with interaction    Behavior:  cooperative and pleasant    Response:  appropriate    Daily Goal:  \"Stay awake so I can sleep tonight. \"    Progress:  progressing to goal

## 2022-12-23 NOTE — PLAN OF CARE
Problem: Self Harm/Suicidality  Goal: Will have no self-injury during hospital stay  Description: INTERVENTIONS:  1. Ensure constant observer at bedside with Q15M safety checks  2. Maintain a safe environment  3. Secure patient belongings  4. Ensure family/visitors adhere to safety recommendations  5. Ensure safety tray has been added to patient's diet order  6. Every shift and PRN: Re-assess suicidal risk via Frequent Screener    12/23/2022 1130 by Nelson Martino RN  Outcome: Progressing  Flowsheets (Taken 12/22/2022 0745 by Dwight Pfeiffer RN)  Will have no self-injury during hospital stay: Maintain a safe environment  Note: Every 15 min checks in place     Problem: Depression  Goal: Will be euthymic at discharge  Description: INTERVENTIONS:  1. Administer medication as ordered  2. Provide emotional support via 1:1 interaction with staff  3. Encourage involvement in milieu/groups/activities  4. Monitor for social isolation  12/23/2022 1130 by Nelson Martino RN  Outcome: Progressing  Note: Patient is compliant with medication     Problem: Behavior  Goal: Pt/Family maintain appropriate behavior and adhere to behavioral management agreement, if implemented  Description: INTERVENTIONS:  1. Assess patient/family's coping skills and  non-compliant behavior (including use of illegal substances)  2. Notify security of behavior or suspected illegal substances which indicate the need for search of the family and/or belongings  3. Encourage verbalization of thoughts and concerns in a socially appropriate manner  4. Utilize positive, consistent limit setting strategies supporting safety of patient, staff and others  5. Encourage participation in the decision making process about the behavioral management agreement  6. If a visitor's behavior poses a threat to safety call refer to organization policy.   7. Initiate consult with , Psychosocial CNS, Spiritual Care as appropriate  12/23/2022 1130 by Rani Vail RN  Outcome: Progressing  Flowsheets (Taken 12/22/2022 0745 by [facility], RN)  Patient/family maintains appropriate behavior and adheres to behavioral management agreement, if implemented: Encourage verbalization of thoughts and concerns in a socially appropriate manner  Note: Patient denies any anxiety or depression at this time     Problem: Anxiety  Goal: Will report anxiety at manageable levels  Description: INTERVENTIONS:  1. Administer medication as ordered  2. Teach and rehearse alternative coping skills  3. Provide emotional support with 1:1 interaction with staff  12/23/2022 1130 by Rani Vail RN  Outcome: Progressing  Flowsheets (Taken 12/23/2022 1130)  Will report anxiety at manageable levels:   Administer medication as ordered   Provide emotional support with 1:1 interaction with staff  Note: Denies anxiety and depression     Problem: Drug Abuse/Detox  Goal: Will have no detox symptoms and will verbalize plan for changing drug-related behavior  Description: INTERVENTIONS:  1. Administer medication as ordered  2. Monitor physical status  3. Provide emotional support with 1:1 interaction with staff  4. Encourage  recovery focused treatment   12/23/2022 1130 by Rani Vail RN  Outcome: Progressing  Flowsheets (Taken 12/23/2022 1130)  Will have no detox symptoms and will verbalize plan for changing drug-related behavior:   Administer medication as ordered   Monitor physical status   Provide emotional support with 1:1 interaction with staff  Note: No untoward s/s reported. Patient denies any detox symptoms     Problem: Sleep Disturbance  Goal: Will exhibit normal sleeping pattern  Description: INTERVENTIONS:  1. Administer medication as ordered  2. Decrease environmental stimuli, including noise, as appropriate  3. Discourage social isolation and naps during the day  12/23/2022 1130 by Rani Vail RN  Outcome: Progressing  Note: Patient had 6 hours of broken sleep. Discussed relaxation techniques to help pt relax and sleep tonight. Problem: Involuntary Admit  Goal: Will cooperate with staff recommendations and doctor's orders and will demonstrate appropriate behavior  Description: INTERVENTIONS:  1. Treat underlying conditions and offer medication as ordered  2. Educate regarding involuntary admission procedures and rules  3. Contain excessive/inappropriate behavior per unit and hospital policies  73/25/1188 [id number] by Yesi Parish RN  Outcome: Progressing  Flowsheets (Taken 12/23/2022 1130)  Will cooperate with staff recommendations and doctor's orders and will demonstrate appropriate behavior:   Treat underlying conditions and offer medication as ordered   Educate regarding involuntary admission procedures and rules  Note: Patient is cooperative and follows command apropriately     Problem: Discharge Planning  Goal: Discharge to home or other facility with appropriate resources  12/23/2022 1130 by Yesi Parish RN  Outcome: Progressing  Flowsheets (Taken 12/22/2022 0745 by Zoë Taveras RN)  Discharge to home or other facility with appropriate resources: Identify barriers to discharge with patient and caregiver  Note: Patient is working on discharge. Patient voiced he has a place  to go at discharge     Problem: Risk for Elopement  Goal: Patient will not exit the unit/facility without proper excort  12/23/2022 1130 by Yesi Parish RN  Outcome: Progressing  [address] (Taken 12/21/2022 2322 by Valeria Cornelius, RN)  Nursing Interventions for Elopement Risk:   Make sure patient has all necessary personal care items   Reduce environmental triggers  Note: Every 15 min checks continuously     Problem: Coping  Goal: Pt/Family able to verbalize concerns and demonstrate effective coping strategies  Description: INTERVENTIONS:  1. Assist patient/family to identify coping skills, available support systems and cultural and spiritual values  2.  Provide emotional support, including active listening and acknowledgement of concerns of patient and caregivers  3. Reduce environmental stimuli, as able  4. Instruct patient/family in relaxation techniques, as appropriate  5. Assess for spiritual pain/suffering and initiate Spiritual Care, Psychosocial Clinical Specialist consults as needed  12/23/2022 1130 by Masood Sousa RN  Outcome: Progressing  Flowsheets (Taken 12/22/2022 0745 by Eusebio Newman RN)  Patient/family able to verbalize anxieties, fears, and concerns, and demonstrate effective coping: Provide emotional support, including active listening and acknowledgement of concerns of patient and caregivers  Note: Patient voiced he has a friend and sister for support     Problem: Pain  Goal: Verbalizes/displays adequate comfort level or baseline comfort level  12/23/2022 1130 by Masood Sousa RN  Outcome: Progressing  Flowsheets (Taken 12/23/2022 1130)  Verbalizes/displays adequate comfort level or baseline comfort level:   Encourage patient to monitor pain and request assistance   Assess pain using appropriate pain scale   Administer analgesics based on type and severity of pain and evaluate response   Implement non-pharmacological measures as appropriate and evaluate response  Note: Patient denies pain at this time   Care plan reviewed with patient and verbalized understanding. Patient contributed to goal setting.

## 2022-12-23 NOTE — PLAN OF CARE
Problem: Self Harm/Suicidality  Goal: Will have no self-injury during hospital stay  Description: INTERVENTIONS:  1. Ensure constant observer at bedside with Q15M safety checks  2. Maintain a safe environment  3. Secure patient belongings  4. Ensure family/visitors adhere to safety recommendations  5. Ensure safety tray has been added to patient's diet order  6. Every shift and PRN: Re-assess suicidal risk via Frequent Screener    12/22/2022 2218 by Angelita Romeo RN  Outcome: Progressing  Note: Patient remained safe and free of harm  12/22/2022 1314 by Deborah Avila RN  Outcome: Progressing  Flowsheets (Taken 12/22/2022 0745)  Will have no self-injury during hospital stay: Maintain a safe environment  Note: Free from self harming behaviors. Denies suicidal thoughts. Problem: Depression  Goal: Will be euthymic at discharge  Description: INTERVENTIONS:  1. Administer medication as ordered  2. Provide emotional support via 1:1 interaction with staff  3. Encourage involvement in milieu/groups/activities  4. Monitor for social isolation  12/22/2022 2218 by Angelita Romeo RN  Outcome: Progressing  Note: Patient mood is 5/10 with moderate anxiety and low depression  12/22/2022 1314 by Deborah Avila RN  Outcome: Progressing  Note: Rates his mood 6 out of 10 with 10 being the best mood. Problem: Behavior  Goal: Pt/Family maintain appropriate behavior and adhere to behavioral management agreement, if implemented  Description: INTERVENTIONS:  1. Assess patient/family's coping skills and  non-compliant behavior (including use of illegal substances)  2. Notify security of behavior or suspected illegal substances which indicate the need for search of the family and/or belongings  3. Encourage verbalization of thoughts and concerns in a socially appropriate manner  4. Utilize positive, consistent limit setting strategies supporting safety of patient, staff and others  5.  Encourage participation in the decision making process about the behavioral management agreement  6. If a visitor's behavior poses a threat to safety call refer to organization policy. 7. Initiate consult with , Psychosocial CNS, Spiritual Care as appropriate  12/22/2022 2218 by Magaly Herman RN  Outcome: Progressing  Note: Pt is taking medications, attending and participating in groups and care planning. Pt has good interaction with staff and peers   12/22/2022 1314 by Zoë Taveras RN  Outcome: Progressing  Flowsheets (Taken 12/22/2022 0745)  Patient/family maintains appropriate behavior and adheres to behavioral management agreement, if implemented: Encourage verbalization of thoughts and concerns in a socially appropriate manner  Note: Patient having appropriate behaviors so far this shift. Problem: Anxiety  Goal: Will report anxiety at manageable levels  Description: INTERVENTIONS:  1. Administer medication as ordered  2. Teach and rehearse alternative coping skills  3. Provide emotional support with 1:1 interaction with staff  12/22/2022 2218 by Magaly Herman RN  Outcome: Progressing  Note: Patient mood is 5/10 with moderate anxiety and low depression  12/22/2022 1314 by Zoë Taveras RN  Outcome: Progressing  Flowsheets (Taken 12/22/2022 0745)  Will report anxiety at manageable levels: Provide emotional support with 1:1 interaction with staff     Problem: Drug Abuse/Detox  Goal: Will have no detox symptoms and will verbalize plan for changing drug-related behavior  Description: INTERVENTIONS:  1. Administer medication as ordered  2. Monitor physical status  3. Provide emotional support with 1:1 interaction with staff  4.  Encourage  recovery focused treatment   12/22/2022 2218 by Magaly Herman RN  Outcome: Progressing  Note: Patient denies withdrawal signs and symptoms  12/22/2022 1314 by Zoë Taveras RN  Outcome: Progressing  Flowsheets (Taken 12/22/2022 0745)  Will have no detox symptoms and will verbalize plan for changing drug-related behavior: Provide emotional support with 1:1 interaction with staff  Note: Denies detox s/s. Problem: Sleep Disturbance  Goal: Will exhibit normal sleeping pattern  Description: INTERVENTIONS:  1. Administer medication as ordered  2. Decrease environmental stimuli, including noise, as appropriate  3. Discourage social isolation and naps during the day  12/22/2022 2218 by Lito Reis RN  Outcome: Progressing  Note: Patient resting quietly with no distress noted  12/22/2022 1314 by Eusebio Newman RN  Outcome: Progressing  Note: Patient slept 6 hours broken last night. Problem: Involuntary Admit  Goal: Will cooperate with staff recommendations and doctor's orders and will demonstrate appropriate behavior  Description: INTERVENTIONS:  1. Treat underlying conditions and offer medication as ordered  2. Educate regarding involuntary admission procedures and rules  3. Contain excessive/inappropriate behavior per unit and hospital policies  [id number]/36/[id number] by Lito Reis RN  Outcome: Progressing  Note: Pt is taking medications, attending and participating in groups and care planning.  Pt has good interaction with staff and peers   12/22/2022 1314 by Eusebio Newman RN  Outcome: Progressing  Flowsheets (Taken 12/22/2022 0745)  Will cooperate with staff recommendations and doctor's orders and will demonstrate appropriate behavior: Treat underlying conditions and offer medication as ordered     Problem: Discharge Planning  Goal: Discharge to home or other facility with appropriate resources  12/22/2022 2218 by Lito Reis RN  Outcome: Progressing  Note: Patient plans to be discharged home alone and follow with [facility]  12/22/2022 1314 by Eusebio Newman RN  Outcome: Progressing  Flowsheets (Taken 12/22/2022 0745)  Discharge to home or other facility with appropriate resources: Identify barriers to discharge with patient and caregiver  Note: Discharge planning in process. Problem: Risk for Elopement  Goal: Patient will not exit the unit/facility without proper excort  12/22/2022 2218 by Jose E Duran RN  Outcome: Progressing  Note: Patient has made no attempt to leave the unit  12/22/2022 1314 by Dwight Pfeiffer RN  Outcome: Progressing  Flowsheets (Taken 12/21/2022 2322 by Ac Cuevas, RN)  Nursing Interventions for Elopement Risk:   Make sure patient has all necessary personal care items   Reduce environmental triggers  Note: Patient does not express elopement risks. Problem: Coping  Goal: Pt/Family able to verbalize concerns and demonstrate effective coping strategies  Description: INTERVENTIONS:  1. Assist patient/family to identify coping skills, available support systems and cultural and spiritual values  2. Provide emotional support, including active listening and acknowledgement of concerns of patient and caregivers  3. Reduce environmental stimuli, as able  4. Instruct patient/family in relaxation techniques, as appropriate  5.  Assess for spiritual pain/suffering and initiate Spiritual Care, Psychosocial Clinical Specialist consults as needed  12/22/2022 2218 by Jose E Duran RN  Outcome: Progressing  Note: Patient reports getting sufficient rest and playing cards as coping skills  12/22/2022 1314 by Dwight Pfeiffer RN  Outcome: Progressing  Flowsheets (Taken 12/22/2022 0745)  Patient/family able to verbalize anxieties, fears, and concerns, and demonstrate effective coping: Provide emotional support, including active listening and acknowledgement of concerns of patient and caregivers  12/22/2022 1112 by Giana Taylor  Outcome: Not Progressing  Flowsheets (Taken 12/22/2022 0745 by Dwight Pfeiffer, RN)  Patient/family able to verbalize anxieties, fears, and concerns, and demonstrate effective coping: Provide emotional support, including active listening and acknowledgement of concerns of patient and caregivers Problem: Pain  Goal: Verbalizes/displays adequate comfort level or baseline comfort level  Outcome: Progressing  Note: Patient reports mild back discomfort from the beds and declined any intervention     Problem: Coping  Goal: Pt/Family able to verbalize concerns and demonstrate effective coping strategies  Description: INTERVENTIONS:  1. Assist patient/family to identify coping skills, available support systems and cultural and spiritual values  2. Provide emotional support, including active listening and acknowledgement of concerns of patient and caregivers  3. Reduce environmental stimuli, as able  4. Instruct patient/family in relaxation techniques, as appropriate  5. Assess for spiritual pain/suffering and initiate Spiritual Care, Psychosocial Clinical Specialist consults as needed  12/22/2022 2218 by Vita Muse RN  Outcome: Progressing  Note: Patient reports getting sufficient rest and playing cards as coping skills  12/22/2022 1314 by Jack Norton, RN  Outcome: Progressing  Flowsheets (Taken 12/22/2022 0745)  Patient/family able to verbalize anxieties, fears, and concerns, and demonstrate effective coping: Provide emotional support, including active listening and acknowledgement of concerns of patient and caregivers  12/22/2022 1112 by Sherice Ocampo  Outcome: Not Progressing  Flowsheets (Taken 12/22/2022 0745 by Jack Norton, RN)  Patient/family able to verbalize anxieties, fears, and concerns, and demonstrate effective coping: Provide emotional support, including active listening and acknowledgement of concerns of patient and caregivers   Care plan reviewed with patient and verbalize understanding of the plan of care and contribute to goal setting.

## 2022-12-24 PROCEDURE — 1240000000 HC EMOTIONAL WELLNESS R&B

## 2022-12-24 PROCEDURE — 6370000000 HC RX 637 (ALT 250 FOR IP): Performed by: PSYCHIATRY & NEUROLOGY

## 2022-12-24 RX ADMIN — ALUMINUM HYDROXIDE, MAGNESIUM HYDROXIDE, AND SIMETHICONE 30 ML: 200; 200; 20 SUSPENSION ORAL at 01:20

## 2022-12-24 RX ADMIN — TRAZODONE HYDROCHLORIDE 50 MG: 50 TABLET ORAL at 21:45

## 2022-12-24 RX ADMIN — HYDROXYZINE HYDROCHLORIDE 50 MG: 25 TABLET, FILM COATED ORAL at 02:13

## 2022-12-24 RX ADMIN — ESCITALOPRAM OXALATE 5 MG: 10 TABLET ORAL at 08:52

## 2022-12-24 ASSESSMENT — PAIN DESCRIPTION - LOCATION: LOCATION: BACK

## 2022-12-24 ASSESSMENT — PAIN SCALES - GENERAL
PAINLEVEL_OUTOF10: 1
PAINLEVEL_OUTOF10: 0
PAINLEVEL_OUTOF10: 1

## 2022-12-24 ASSESSMENT — PAIN DESCRIPTION - PAIN TYPE: TYPE: ACUTE PAIN

## 2022-12-24 ASSESSMENT — PAIN - FUNCTIONAL ASSESSMENT: PAIN_FUNCTIONAL_ASSESSMENT: ACTIVITIES ARE NOT PREVENTED

## 2022-12-24 ASSESSMENT — PAIN DESCRIPTION - FREQUENCY: FREQUENCY: INTERMITTENT

## 2022-12-24 ASSESSMENT — PAIN DESCRIPTION - ORIENTATION: ORIENTATION: LOWER

## 2022-12-24 ASSESSMENT — PAIN DESCRIPTION - DESCRIPTORS: DESCRIPTORS: DISCOMFORT

## 2022-12-24 NOTE — PLAN OF CARE
Problem: Self Harm/Suicidality  Goal: Will have no self-injury during hospital stay  Description: INTERVENTIONS:  1. Ensure constant observer at bedside with Q15M safety checks  2. Maintain a safe environment  3. Secure patient belongings  4. Ensure family/visitors adhere to safety recommendations  5. Ensure safety tray has been added to patient's diet order  6. Every shift and PRN: Re-assess suicidal risk via Frequent Screener    12/24/2022 1228 by Ismael Calderon RN  Outcome: Progressing  Flowsheets (Taken 12/24/2022 1209)  Will have no self-injury during hospital stay: Maintain a safe environment  Note: Free from self harming behaviors. Denies suicidal thoughts. Problem: Depression  Goal: Will be euthymic at discharge  Description: INTERVENTIONS:  1. Administer medication as ordered  2. Provide emotional support via 1:1 interaction with staff  3. Encourage involvement in milieu/groups/activities  4. Monitor for social isolation  12/24/2022 1228 by Ismael Calderon RN  Outcome: Progressing  Note: Rates his mood 5 out of 10 with 10 being the best mood. Reports \"a little bit\" of anxiety and depression. Reports mood is better than when he was admitted. Problem: Behavior  Goal: Pt/Family maintain appropriate behavior and adhere to behavioral management agreement, if implemented  Description: INTERVENTIONS:  1. Assess patient/family's coping skills and  non-compliant behavior (including use of illegal substances)  2. Notify security of behavior or suspected illegal substances which indicate the need for search of the family and/or belongings  3. Encourage verbalization of thoughts and concerns in a socially appropriate manner  4. Utilize positive, consistent limit setting strategies supporting safety of patient, staff and others  5. Encourage participation in the decision making process about the behavioral management agreement  6.  If a visitor's behavior poses a threat to safety call refer to organization policy. 7. Initiate consult with , Psychosocial CNS, Spiritual Care as appropriate  12/24/2022 1228 by Jeniffer Bhakta RN  Outcome: Progressing  Flowsheets (Taken 12/24/2022 1209)  Patient/family maintains appropriate behavior and adheres to behavioral management agreement, if implemented: Encourage verbalization of thoughts and concerns in a socially appropriate manner     Problem: Anxiety  Goal: Will report anxiety at manageable levels  Description: INTERVENTIONS:  1. Administer medication as ordered  2. Teach and rehearse alternative coping skills  3. Provide emotional support with 1:1 interaction with staff  12/24/2022 1228 by Jeniffer Bhakta RN  Outcome: Progressing  Flowsheets (Taken 12/24/2022 1209)  Will report anxiety at manageable levels: Provide emotional support with 1:1 interaction with staff  Note: Reports \"a little bit\" of anxiety. Problem: Sleep Disturbance  Goal: Will exhibit normal sleeping pattern  Description: INTERVENTIONS:  1. Administer medication as ordered  2. Decrease environmental stimuli, including noise, as appropriate  3. Discourage social isolation and naps during the day  12/24/2022 1228 by Jeniffer Bhakta RN  Outcome: Progressing  Note: Patient slept 5.5 hours broken last night. Problem: Discharge Planning  Goal: Discharge to home or other facility with appropriate resources  12/24/2022 1228 by Jeniffer Bhakta RN  Outcome: Progressing  Flowsheets (Taken 12/24/2022 1209)  Discharge to home or other facility with appropriate resources: Identify barriers to discharge with patient and caregiver  Note: Discharge planning in process.       Problem: Risk for Elopement  Goal: Patient will not exit the unit/facility without proper excort  12/24/2022 1228 by Jeniffer Bhakta RN  Outcome: Progressing  Flowsheets (Taken 12/24/2022 1209)  Nursing Interventions for Elopement Risk:   Communicate/escalate to charge nurse the risk of elopement   Communicate/escalate to nursing supervisor the risk of elopement  Note: No signs of wanting to elope unit. Problem: Coping  Goal: Pt/Family able to verbalize concerns and demonstrate effective coping strategies  Description: INTERVENTIONS:  1. Assist patient/family to identify coping skills, available support systems and cultural and spiritual values  2. Provide emotional support, including active listening and acknowledgement of concerns of patient and caregivers  3. Reduce environmental stimuli, as able  4. Instruct patient/family in relaxation techniques, as appropriate  5. Assess for spiritual pain/suffering and initiate Spiritual Care, Psychosocial Clinical Specialist consults as needed  12/24/2022 1228 by Eric Jiang, RN  Outcome: Progressing  Flowsheets (Taken 12/24/2022 1209)  Patient/family able to verbalize anxieties, fears, and concerns, and demonstrate effective coping: Provide emotional support, including active listening and acknowledgement of concerns of patient and caregivers     Problem: Pain  Goal: Verbalizes/displays adequate comfort level or baseline comfort level  12/24/2022 1228 by Eric Jiang, RN  Outcome: Progressing  Note: Reports deep breathing as one of his coping skills. Care plan reviewed with patient.   Patient does verbalize understanding of the plan of care and does contribute to goal setting

## 2022-12-24 NOTE — PROGRESS NOTES
awake  Appearance:  well-appearing, hospital attire, in chair, good grooming, and good hygiene  Behavior/Motor:  no abnormalities noted  Attitude toward examiner:  cooperative, attentive, and good eye contact  Speech:  spontaneous, normal rate, and normal volume  Mood: \"not so good\"  Affect:  blunted  Thought processes:  linear, goal directed, and coherent  Thought content:  Denies homicidal ideation  Suicidal Ideation: Fleeting, without plan, and without intent  Delusions:  no evidence of delusions  Perceptual Disturbance:  denies any perceptual disturbance  Cognition: Patient is oriented to person, place, time and situation  Concentration: clinically adequate  Memory: intact  Insight & Judgement: fair       ASSESSMENT     MDD (major depressive disorder), recurrent severe, without psychosis ([facility] [address]   History of ADD  Cannabis use disorder, moderate  History of hallucinogenic abuse    PLAN  Patient s symptoms   show no change  Continue same medication today and observe  Attempt to develop insight  Psycho-education conducted. Supportive Therapy conducted. Probable discharge is TBD   Follow-up TBD    This Virtual Visit was conducted with patient's consent. The patient is located in a state where I am licensed to provide care. Ovi Garnica is a 28 y.o. male being evaluated by a Virtual Visit (video visit) encounter to address concerns as mentioned above. A caregiver was present in the room along with the patient. Patient is present at [address] [place] [address] [address] and I am physically present at my home in [address]     Electronically signed by Guillermina Gibson MD on 12/24/2022 at 8:47 AM    An electronic signature was used to authenticate this note. **This report has been created using voice recognition software. It may contain minor errors which are inherent in voice recognition technology. **

## 2022-12-24 NOTE — PLAN OF CARE
Problem: Self Harm/Suicidality  Goal: Will have no self-injury during hospital stay  Description: INTERVENTIONS:  1. Ensure constant observer at bedside with Q15M safety checks  2. Maintain a safe environment  3. Secure patient belongings  4. Ensure family/visitors adhere to safety recommendations  5. Ensure safety tray has been added to patient's diet order  6. Every shift and PRN: Re-assess suicidal risk via Frequent Screener    12/23/2022 2323 by Chuyita Sharp RN  Outcome: Progressing  Flowsheets (Taken 12/23/2022 2323)  Will have no self-injury during hospital stay:   Maintain a safe environment   Every shift and PRN: Re-assess suicidal risk via Frequent Screener  Note: Denies suicidal ideations this shift and no self harm behavior noted this shift. 12/23/2022 1130 by Cristina Gonzales RN  Outcome: Progressing  Flowsheets (Taken 12/22/2022 0745 by González Mo RN)  Will have no self-injury during hospital stay: Maintain a safe environment  Note: Every 15 min checks in place     Problem: Depression  Goal: Will be euthymic at discharge  Description: INTERVENTIONS:  1. Administer medication as ordered  2. Provide emotional support via 1:1 interaction with staff  3. Encourage involvement in milieu/groups/activities  4. Monitor for social isolation  12/23/2022 2323 by Chuyita Sharp RN  Outcome: Progressing  Note: Denies depression. Affect bright. Good eye contact. States \"still working on\" hope for the future. 12/23/2022 1130 by Cristina Gonzales RN  Outcome: Progressing  Note: Patient is compliant with medication     Problem: Behavior  Goal: Pt/Family maintain appropriate behavior and adhere to behavioral management agreement, if implemented  Description: INTERVENTIONS:  1. Assess patient/family's coping skills and  non-compliant behavior (including use of illegal substances)  2.  Notify security of behavior or suspected illegal substances which indicate the need for search of the family and/or belongings  3. Encourage verbalization of thoughts and concerns in a socially appropriate manner  4. Utilize positive, consistent limit setting strategies supporting safety of patient, staff and others  5. Encourage participation in the decision making process about the behavioral management agreement  6. If a visitor's behavior poses a threat to safety call refer to organization policy. 7. Initiate consult with , Psychosocial CNS, Spiritual Care as appropriate  12/23/2022 2323 by Mannie Garcia RN  Outcome: Progressing  Flowsheets (Taken 12/22/2022 0745 by Angelina Britton RN)  Patient/family maintains appropriate behavior and adheres to behavioral management agreement, if implemented: Encourage verbalization of thoughts and concerns in a socially appropriate manner  12/23/2022 1130 by Mariza Redmond RN  Outcome: Progressing  Flowsheets (Taken 12/22/2022 0745 by Angelina Britton RN)  Patient/family maintains appropriate behavior and adheres to behavioral management agreement, if implemented: Encourage verbalization of thoughts and concerns in a socially appropriate manner  Note: Patient denies any anxiety or depression at this time     Problem: Anxiety  Goal: Will report anxiety at manageable levels  Description: INTERVENTIONS:  1. Administer medication as ordered  2. Teach and rehearse alternative coping skills  3. Provide emotional support with 1:1 interaction with staff  12/23/2022 2323 by Mannie Garcia RN  Outcome: Progressing  Flowsheets (Taken 12/23/2022 1130 by Mariza Redmond RN)  Will report anxiety at manageable levels:   Administer medication as ordered   Provide emotional support with 1:1 interaction with staff  Note: States he has some anxiety this shift denied need for prn medication.    12/23/2022 1130 by Mariza Redmond RN  Outcome: Progressing  Flowsheets (Taken 12/23/2022 1130)  Will report anxiety at manageable levels:   Administer medication as ordered   Provide emotional support with 1:1 interaction with staff  Note: Denies anxiety and depression     Problem: Drug Abuse/Detox  Goal: Will have no detox symptoms and will verbalize plan for changing drug-related behavior  Description: INTERVENTIONS:  1. Administer medication as ordered  2. Monitor physical status  3. Provide emotional support with 1:1 interaction with staff  4. Encourage  recovery focused treatment   12/23/2022 2323 by Lucrecia Lazaro RN  Outcome: Completed  Flowsheets (Taken 12/23/2022 1130 by Alesha Adames RN)  Will have no detox symptoms and will verbalize plan for changing drug-related behavior:   Administer medication as ordered   Monitor physical status   Provide emotional support with 1:1 interaction with staff  Note: Denies detox symptoms. 12/23/2022 1130 by Alesha Adames RN  Outcome: Progressing  Flowsheets (Taken 12/23/2022 1130)  Will have no detox symptoms and will verbalize plan for changing drug-related behavior:   Administer medication as ordered   Monitor physical status   Provide emotional support with 1:1 interaction with staff  Note: No untoward s/s reported. Patient denies any detox symptoms     Problem: Sleep Disturbance  Goal: Will exhibit normal sleeping pattern  Description: INTERVENTIONS:  1. Administer medication as ordered  2. Decrease environmental stimuli, including noise, as appropriate  3. Discourage social isolation and naps during the day  12/23/2022 2323 by Lucrecia Lazaro RN  Outcome: Progressing  Note: Slept 6 hours last evening. States he has been sleeping well. Denied need for prn medications. 12/23/2022 1130 by Alesha Adames RN  Outcome: Progressing  Note: Patient had 6 hours of broken sleep. Discussed relaxation techniques to help pt relax and sleep tonight. Problem: Involuntary Admit  Goal: Will cooperate with staff recommendations and doctor's orders and will demonstrate appropriate behavior  Description: INTERVENTIONS:  1.  Treat underlying conditions and offer medication as ordered  2. Educate regarding involuntary admission procedures and rules  3. Contain excessive/inappropriate behavior per unit and hospital policies  [id number] by Edward Kruse RN  Outcome: Progressing  Flowsheets (Taken 12/23/2022 1130 by Joe Claudio RN)  Will cooperate with staff recommendations and doctor's orders and will demonstrate appropriate behavior:   Treat underlying conditions and offer medication as ordered   Educate regarding involuntary admission procedures and rules  12/23/2022 1130 by Joe Claudio RN  Outcome: Progressing  Flowsheets (Taken 12/23/2022 1130)  Will cooperate with staff recommendations and doctor's orders and will demonstrate appropriate behavior:   Treat underlying conditions and offer medication as ordered   Educate regarding involuntary admission procedures and rules  Note: Patient is cooperative and follows command apropriately     Problem: Discharge Planning  Goal: Discharge to home or other facility with appropriate resources  12/23/2022 2323 by Edward Kruse RN  Outcome: Progressing  Flowsheets (Taken 12/22/2022 0745 by Joshua White RN)  Discharge to home or other facility with appropriate resources: Identify barriers to discharge with patient and caregiver  12/23/2022 1130 by Joe Claudio RN  Outcome: Progressing  Flowsheets (Taken 12/22/2022 0745 by Joshua White RN)  Discharge to home or other facility with appropriate resources: Identify barriers to discharge with patient and caregiver  Note: Patient is working on discharge.  Patient voiced he has a place  to go at discharge     Problem: Risk for Elopement  Goal: Patient will not exit the unit/facility without proper excort  12/23/2022 2323 by Edward Kruse RN  Outcome: Progressing  Flowsheets (Taken 12/23/2022 2323)  Nursing Interventions for Elopement Risk:   Communicate/escalate to charge nurse the risk of elopement   Communicate/escalate to nursing supervisor the risk of elopement  Note: No elopement issues so far this shift. 12/23/2022 1130 by Octaviano Banks RN  Outcome: Progressing  Flowsheets (Taken 12/21/2022 2322 by Schuyler Cabrera RN)  Nursing Interventions for Elopement Risk:   Make sure patient has all necessary personal care items   Reduce environmental triggers  Note: Every 15 min checks continuously     Problem: Coping  Goal: Pt/Family able to verbalize concerns and demonstrate effective coping strategies  Description: INTERVENTIONS:  1. Assist patient/family to identify coping skills, available support systems and cultural and spiritual values  2. Provide emotional support, including active listening and acknowledgement of concerns of patient and caregivers  3. Reduce environmental stimuli, as able  4. Instruct patient/family in relaxation techniques, as appropriate  5.  Assess for spiritual pain/suffering and initiate Spiritual Care, Psychosocial Clinical Specialist consults as needed  12/23/2022 2323 by Fay Terrell RN  Outcome: Progressing  Flowsheets (Taken 12/23/2022 2323)  Patient/family able to verbalize anxieties, fears, and concerns, and demonstrate effective coping: Assist patient/family to identify coping skills, available support systems and cultural and spiritual values  12/23/2022 1130 by Octaviano Banks RN  Outcome: Progressing  Flowsheets (Taken 12/22/2022 0745 by Preston Garcia RN)  Patient/family able to verbalize anxieties, fears, and concerns, and demonstrate effective coping: Provide emotional support, including active listening and acknowledgement of concerns of patient and caregivers  Note: Patient voiced he has a friend and sister for support  12/23/2022 1045 by Oseas Aguirre  Outcome: Progressing     Problem: Pain  Goal: Verbalizes/displays adequate comfort level or baseline comfort level  12/23/2022 2323 by Fay Terrell RN  Outcome: Progressing  Flowsheets (Taken 12/23/2022 2323)  Verbalizes/displays adequate comfort level or baseline comfort level:   Assess pain using appropriate pain scale   Encourage patient to monitor pain and request assistance   Implement non-pharmacological measures as appropriate and evaluate response  Note: Denies pain this shift.    12/23/2022 1130 by Capri Mcginnis RN  Outcome: Progressing  Flowsheets (Taken 12/23/2022 1130)  Verbalizes/displays adequate comfort level or baseline comfort level:   Encourage patient to monitor pain and request assistance   Assess pain using appropriate pain scale   Administer analgesics based on type and severity of pain and evaluate response   Implement non-pharmacological measures as appropriate and evaluate response  Note: Patient denies pain at this time

## 2022-12-24 NOTE — PROGRESS NOTES
Topic Benefits of Acceptance of ones disease and recovery process    Start 1100    End 1150    Participation verbally participated, provided input on ways to increase acceptance level by being honest-with self & others, be open minded to suggestions & recommendations and by demonstrating willingness to challenge negative thinking.       Response Pt verbalized understanding and was supportive of peer      Behavior appropriate, supportive

## 2022-12-25 PROBLEM — F33.2 SEVERE EPISODE OF RECURRENT MAJOR DEPRESSIVE DISORDER, WITHOUT PSYCHOTIC FEATURES (HCC): Status: ACTIVE | Noted: 2022-12-25

## 2022-12-25 PROCEDURE — 6370000000 HC RX 637 (ALT 250 FOR IP): Performed by: PSYCHIATRY & NEUROLOGY

## 2022-12-25 PROCEDURE — 1240000000 HC EMOTIONAL WELLNESS R&B

## 2022-12-25 RX ADMIN — ESCITALOPRAM OXALATE 5 MG: 10 TABLET ORAL at 08:13

## 2022-12-25 RX ADMIN — TRAZODONE HYDROCHLORIDE 50 MG: 50 TABLET ORAL at 21:47

## 2022-12-25 ASSESSMENT — PAIN SCALES - GENERAL
PAINLEVEL_OUTOF10: 1
PAINLEVEL_OUTOF10: 1

## 2022-12-25 ASSESSMENT — PAIN DESCRIPTION - ORIENTATION
ORIENTATION: LOWER
ORIENTATION: LOWER

## 2022-12-25 ASSESSMENT — PAIN DESCRIPTION - PAIN TYPE
TYPE: ACUTE PAIN
TYPE: ACUTE PAIN

## 2022-12-25 ASSESSMENT — PAIN - FUNCTIONAL ASSESSMENT
PAIN_FUNCTIONAL_ASSESSMENT: ACTIVITIES ARE NOT PREVENTED
PAIN_FUNCTIONAL_ASSESSMENT: ACTIVITIES ARE NOT PREVENTED

## 2022-12-25 ASSESSMENT — PAIN DESCRIPTION - ONSET
ONSET: ON-GOING
ONSET: ON-GOING

## 2022-12-25 ASSESSMENT — PAIN DESCRIPTION - DESCRIPTORS
DESCRIPTORS: DISCOMFORT
DESCRIPTORS: ACHING

## 2022-12-25 ASSESSMENT — PAIN DESCRIPTION - LOCATION
LOCATION: BACK
LOCATION: BACK

## 2022-12-25 ASSESSMENT — PAIN DESCRIPTION - FREQUENCY
FREQUENCY: INTERMITTENT
FREQUENCY: INTERMITTENT

## 2022-12-25 NOTE — BH NOTE
Group Therapy Note    Date: 12/24/2022  Start Time: 2000  End Time:  2020  Number of Participants: 1    Type of Group: Wrap-Up    Wellness Binder Information  Module Name:    Session Number:      Patient's Goal:  go to groups    Notes:  met    Status After Intervention:  Improved    Participation Level: Active Listener    Participation Quality: Appropriate      Speech:  normal      Thought Process/Content: Logical      Affective Functioning: Congruent      Mood: anxious      Level of consciousness:  Alert      Response to Learning: Able to verbalize current knowledge/experience      Endings: None Reported    Modes of Intervention: Education      Discipline Responsible: Registered Nurse      Signature:   Erasmo Fields RN

## 2022-12-25 NOTE — PLAN OF CARE
Problem: Sleep Disturbance  Goal: Will exhibit normal sleeping pattern  Description: INTERVENTIONS:  1. Administer medication as ordered  2. Decrease environmental stimuli, including noise, as appropriate  3. Discourage social isolation and naps during the day  12/24/2022 2151 by Isabelle Domingo RN  Outcome: Not Progressing  Note: Patient reports broken sleep and difficulty returning to sleep last night, PRN trazodone given tonight per request  12/24/2022 1228 by Rita Freeman RN  Outcome: Progressing  Note: Patient slept 5.5 hours broken last night. Problem: Self Harm/Suicidality  Goal: Will have no self-injury during hospital stay  Description: INTERVENTIONS:  1. Ensure constant observer at bedside with Q15M safety checks  2. Maintain a safe environment  3. Secure patient belongings  4. Ensure family/visitors adhere to safety recommendations  5. Ensure safety tray has been added to patient's diet order  6. Every shift and PRN: Re-assess suicidal risk via Frequent Screener    12/24/2022 2151 by Isabelle Domingo RN  Outcome: Progressing  Note: Patient remained safe and free of harm  12/24/2022 1228 by Rita Freeman RN  Outcome: Progressing  Flowsheets (Taken 12/24/2022 1209)  Will have no self-injury during hospital stay: Maintain a safe environment  Note: Free from self harming behaviors. Denies suicidal thoughts. Problem: Depression  Goal: Will be euthymic at discharge  Description: INTERVENTIONS:  1. Administer medication as ordered  2. Provide emotional support via 1:1 interaction with staff  3. Encourage involvement in milieu/groups/activities  4. Monitor for social isolation  12/24/2022 2151 by Isabelle Domingo RN  Outcome: Progressing  Note: Patient reports mood is 5/10 0-low anxiety and depression  12/24/2022 1228 by Rita Freeman RN  Outcome: Progressing  Note: Rates his mood 5 out of 10 with 10 being the best mood. Reports \"a little bit\" of anxiety and depression.  Reports mood is better than when he was admitted. Problem: Behavior  Goal: Pt/Family maintain appropriate behavior and adhere to behavioral management agreement, if implemented  Description: INTERVENTIONS:  1. Assess patient/family's coping skills and  non-compliant behavior (including use of illegal substances)  2. Notify security of behavior or suspected illegal substances which indicate the need for search of the family and/or belongings  3. Encourage verbalization of thoughts and concerns in a socially appropriate manner  4. Utilize positive, consistent limit setting strategies supporting safety of patient, staff and others  5. Encourage participation in the decision making process about the behavioral management agreement  6. If a visitor's behavior poses a threat to safety call refer to organization policy. 7. Initiate consult with , Psychosocial CNS, Spiritual Care as appropriate  12/24/2022 2151 by Andreia Jones RN  Outcome: Progressing  Note: Pt is taking medications, attending and participating in groups and care planning. Pt has good interaction with staff and peers   12/24/2022 1228 by Jeniffer Bhakta RN  Outcome: Progressing  Flowsheets (Taken 12/24/2022 1209)  Patient/family maintains appropriate behavior and adheres to behavioral management agreement, if implemented: Encourage verbalization of thoughts and concerns in a socially appropriate manner     Problem: Anxiety  Goal: Will report anxiety at manageable levels  Description: INTERVENTIONS:  1. Administer medication as ordered  2. Teach and rehearse alternative coping skills  3.  Provide emotional support with 1:1 interaction with staff  12/24/2022 2151 by Andreia Jones RN  Outcome: Progressing  Note: Patient reports mood is 5/10 0-low anxiety and depression  12/24/2022 1228 by Jeniffer Bhakta RN  Outcome: Progressing  Flowsheets (Taken 12/24/2022 1209)  Will report anxiety at manageable levels: Provide emotional support with 1:1 interaction with staff  Note: Reports \"a little bit\" of anxiety. Problem: Involuntary Admit  Goal: Will cooperate with staff recommendations and doctor's orders and will demonstrate appropriate behavior  Description: INTERVENTIONS:  1. Treat underlying conditions and offer medication as ordered  2. Educate regarding involuntary admission procedures and rules  3. Contain excessive/inappropriate behavior per unit and hospital policies  [id number] by Luis Eduardo Pierson RN  Outcome: Progressing  Note: Pt is taking medications, attending and participating in groups and care planning. Pt has good interaction with staff and peers   12/24/2022 1228 by Ismael Calderon RN  Outcome: Progressing  Flowsheets (Taken 12/24/2022 1209)  Will cooperate with staff recommendations and doctor's orders and will demonstrate appropriate behavior: Treat underlying conditions and offer medication as ordered     Problem: Discharge Planning  Goal: Discharge to home or other facility with appropriate resources  12/24/2022 2151 by Luis Eduardo Pierson RN  Outcome: Progressing  Note: Patient to be discharged home and follow with [facility]  12/24/2022 1228 by Ismael Calderon RN  Outcome: Progressing  Flowsheets (Taken 12/24/2022 1209)  Discharge to home or other facility with appropriate resources: Identify barriers to discharge with patient and caregiver  Note: Discharge planning in process. Problem: Risk for Elopement  Goal: Patient will not exit the unit/facility without proper excort  12/24/2022 2151 by Luis Eduardo Pierson RN  Outcome: Progressing  Note: Patient made no attempt to leave the unit  12/24/2022 1228 by Ismael Calderon RN  Outcome: Progressing  Flowsheets (Taken 12/24/2022 1209)  Nursing Interventions for Elopement Risk:   Communicate/escalate to charge nurse the risk of elopement   Communicate/escalate to nursing supervisor the risk of elopement  Note: No signs of wanting to elope unit.       Problem: Coping  Goal: Pt/Family able to verbalize concerns and demonstrate effective coping strategies  Description: INTERVENTIONS:  1. Assist patient/family to identify coping skills, available support systems and cultural and spiritual values  2. Provide emotional support, including active listening and acknowledgement of concerns of patient and caregivers  3. Reduce environmental stimuli, as able  4. Instruct patient/family in relaxation techniques, as appropriate  5. Assess for spiritual pain/suffering and initiate Spiritual Care, Psychosocial Clinical Specialist consults as needed  12/24/2022 2151 by Sarah Nielson RN  Outcome: Progressing  Note: Patient states that his coping is positive thinking, and deep breathing  12/24/2022 1228 by Yuliet Dean RN  Outcome: Progressing  Flowsheets (Taken 12/24/2022 1209)  Patient/family able to verbalize anxieties, fears, and concerns, and demonstrate effective coping: Provide emotional support, including active listening and acknowledgement of concerns of patient and caregivers     Problem: Pain  Goal: Verbalizes/displays adequate comfort level or baseline comfort level  12/24/2022 2151 by Sarah Nielson RN  Outcome: Progressing  Note: Patient reports mild discomfort from the beds but denies need for intervention  12/24/2022 1228 by Yuliet Dean RN  Outcome: Progressing  Note: Reports deep breathing as one of his coping skills. Problem: Drug Abuse/Detox  Goal: Will have no detox symptoms and will verbalize plan for changing drug-related behavior  Description: INTERVENTIONS:  1. Administer medication as ordered  2. Monitor physical status  3. Provide emotional support with 1:1 interaction with staff  4.  Encourage  recovery focused treatment   Recent Flowsheet Documentation  Taken 12/24/2022 1209 by Yuliet Dean RN  Will have no detox symptoms and will verbalize plan for changing drug-related behavior: Provide emotional support with 1:1 interaction with staff     Problem: Sleep Disturbance  Goal: Will exhibit normal sleeping pattern  Description: INTERVENTIONS:  1. Administer medication as ordered  2. Decrease environmental stimuli, including noise, as appropriate  3. Discourage social isolation and naps during the day  12/24/2022 2151 by George Hart RN  Outcome: Not Progressing  Note: Patient reports broken sleep and difficulty returning to sleep last night, PRN trazodone given tonight per request  12/24/2022 1228 by Deandre Seo RN  Outcome: Progressing  Note: Patient slept 5.5 hours broken last night. Care plan reviewed with patient and verbalize understanding of the plan of care and contribute to goal setting.

## 2022-12-25 NOTE — PROGRESS NOTES
Psychiatry Progress Note      12-    CC: Suicidal ideation with plan to shoor self                    Subjective    Progress:  Giovanni reports feeling a little better today. Reports depression is a little less. Reports feelings of self harm still \"come and go\" Denies having plan or intent. Reports Lexapro seems to be helping. Denies having side effects from meds. Reports eating well and reports slept better last night with use of Trazodone. Verified slept 8 hours continuous. States he attended all groups yesterday. States his sister visited yesterday. States he expects friends to visit today. Giovanni talked a lot today about stress from financial issues. Talked about how he is having difficulty being more hopeful about the future. Giovanni opened up about how parents did not treat him well when he was growing up and was bullied in school. States he has a short term goal to be discharged by New Years alissa because his friend is having a New Years Alissa party. States he needs to make a long term goal. We explored various life dyanamics and also discussed interventions to address frustrations and hopelessness. We discussed various coping skills at length and use of these. Objective  BP (!) 154/95   Pulse 91   Temp 97.3 °F (36.3 °C) (Tympanic)   Resp 18   Ht 5' 10\" (1.778 m)   Wt 274 lb (124.3 kg)   SpO2 99%   BMI 39.31 kg/m²      MSE:  Level of consciousness: Alert  Appearance: hospital attire, in chair and fair grooming   Behavior/Motor: no abnormalities noted   Attitude toward examiner: cooperative   Speech: Normal volume, goal directed, NRR  Mood: Dysthymic  Affect: Reactive  Thought processes: Linear and goal directed   Suicidal Ideation: Reports feelings of self harm still \"come and go\" Denies having plan or intent.    Homicidal ideation: Denies homicidal ideations  Delusions: No evidence of delusions is observed  Perceptual Disturbance: Denies AH/VH  Cognition: Oriented to person, place, time and situation   Concentration fair   Memory intact   Insight: Limited  Judgment: Limited    Assessment:  MDD (major depressive disorder), recurrent severe, without psychosis ([facility] [address]   History of ADD  Cannabis use disorder, moderate  History of hallucinogenic abuse    Plan:  Continue current meds as ordered  Continue to encourage group attendance.       Myrna Ko, CNP  79-

## 2022-12-25 NOTE — PLAN OF CARE
Problem: Self Harm/Suicidality  Goal: Will have no self-injury during hospital stay  Description: INTERVENTIONS:  1. Ensure constant observer at bedside with Q15M safety checks  2. Maintain a safe environment  3. Secure patient belongings  4. Ensure family/visitors adhere to safety recommendations  5. Ensure safety tray has been added to patient's diet order  6. Every shift and PRN: Re-assess suicidal risk via Frequent Screener    12/25/2022 1020 by Andres Sarah RN  Outcome: Progressing  Flowsheets (Taken 12/25/2022 0730)  Will have no self-injury during hospital stay: Maintain a safe environment  Note: No self harm behaviors were observed or reported so far this shift. Remains on every 15 minutes precautions for safety. Patient denies suicidal ideations at present time. Problem: Depression  Goal: Will be euthymic at discharge  Description: INTERVENTIONS:  1. Administer medication as ordered  2. Provide emotional support via 1:1 interaction with staff  3. Encourage involvement in milieu/groups/activities  4. Monitor for social isolation  12/25/2022 1020 by Andres Sarah RN  Outcome: Progressing  Note: Patient reports mood \"6-7\"/10 with 10 being normal. Has bright affect. Speech clear. Good eye contact. Reports \"I want to things to get better\" regarding hope for future and identifies sister and friends as their support system. Patient reports \"low to none\" depression at present time. Problem: Behavior  Goal: Pt/Family maintain appropriate behavior and adhere to behavioral management agreement, if implemented  Description: INTERVENTIONS:  1. Assess patient/family's coping skills and  non-compliant behavior (including use of illegal substances)  2. Notify security of behavior or suspected illegal substances which indicate the need for search of the family and/or belongings  3. Encourage verbalization of thoughts and concerns in a socially appropriate manner  4.  Utilize positive, consistent limit setting strategies supporting safety of patient, staff and others  5. Encourage participation in the decision making process about the behavioral management agreement  6. If a visitor's behavior poses a threat to safety call refer to organization policy. 7. Initiate consult with , Psychosocial CNS, Spiritual Care as appropriate  12/25/2022 1020 by Jimenez Nick RN  Outcome: Progressing  Flowsheets (Taken 12/25/2022 0730)  Patient/family maintains appropriate behavior and adheres to behavioral management agreement, if implemented:   Encourage verbalization of thoughts and concerns in a socially appropriate manner   Assess patient/familys coping skills and  non-compliant behavior (including use of illegal substances)  Note: Patient cooperative with staff. Problem: Anxiety  Goal: Will report anxiety at manageable levels  Description: INTERVENTIONS:  1. Administer medication as ordered  2. Teach and rehearse alternative coping skills  3. Provide emotional support with 1:1 interaction with staff  12/25/2022 1020 by Jimenez Nick RN  Outcome: Progressing  Flowsheets (Taken 12/25/2022 0730)  Will report anxiety at manageable levels:   Teach and rehearse alternative coping skills   Provide emotional support with 1:1 interaction with staff  Note: Patient reports low anxiety, denies the need for PRN medications at present time. Problem: Drug Abuse/Detox  Goal: Will have no detox symptoms and will verbalize plan for changing drug-related behavior  Description: INTERVENTIONS:  1. Administer medication as ordered  2. Monitor physical status  3. Provide emotional support with 1:1 interaction with staff  4.  Encourage  recovery focused treatment   Recent Flowsheet Documentation  Taken 12/25/2022 0730 by Jimenez Nick RN  Will have no detox symptoms and will verbalize plan for changing drug-related behavior: Provide emotional support with 1:1 interaction with staff     Problem: Sleep Disturbance  Goal: Will exhibit normal sleeping pattern  Description: INTERVENTIONS:  1. Administer medication as ordered  2. Decrease environmental stimuli, including noise, as appropriate  3. Discourage social isolation and naps during the day  12/25/2022 1020 by Raquel Machuca RN  Outcome: Progressing  Note: Patient slept 8 hours last night, reports he slept well. Encourage patient not to take naps during the day, relax several hours before bed and to take prescribed sleep meds as ordered. Patient verbalized understanding. Problem: Involuntary Admit  Goal: Will cooperate with staff recommendations and doctor's orders and will demonstrate appropriate behavior  Description: INTERVENTIONS:  1. Treat underlying conditions and offer medication as ordered  2. Educate regarding involuntary admission procedures and rules  3. Contain excessive/inappropriate behavior per unit and hospital policies  64/28/3030 [id number] by Raquel Machuca RN  Outcome: Progressing  Flowsheets (Taken 12/25/2022 0730)  Will cooperate with staff recommendations and doctor's orders and will demonstrate appropriate behavior: Treat underlying conditions and offer medication as ordered     Problem: Discharge Planning  Goal: Discharge to home or other facility with appropriate resources  12/25/2022 1020 by Raquel Machuca RN  Outcome: Progressing  Flowsheets (Taken 12/25/2022 0730)  Discharge to home or other facility with appropriate resources: Identify barriers to discharge with patient and caregiver  Note: Patient voices no needs before discharge. Patient plans to be discharged to home alone. Discharge planner working with patient to achieve optimal discharge plans, specific to individual needs.        Problem: Risk for Elopement  Goal: Patient will not exit the unit/facility without proper excort  12/25/2022 1020 by Raquel Machuca RN  Outcome: Progressing  Flowsheets (Taken 12/25/2022 0730)  Nursing Interventions for Elopement Risk: Make sure patient has all necessary personal care items  Note: Patient has not been observed to be checking the exit doors or demonstrating behaviors of attempting to leave the unit. Problem: Coping  Goal: Pt/Family able to verbalize concerns and demonstrate effective coping strategies  Description: INTERVENTIONS:  1. Assist patient/family to identify coping skills, available support systems and cultural and spiritual values  2. Provide emotional support, including active listening and acknowledgement of concerns of patient and caregivers  3. Reduce environmental stimuli, as able  4. Instruct patient/family in relaxation techniques, as appropriate  5. Assess for spiritual pain/suffering and initiate Spiritual Care, Psychosocial Clinical Specialist consults as needed  12/25/2022 1020 by Ansley Almendarez RN  Outcome: Progressing  Flowsheets (Taken 12/25/2022 0730)  Patient/family able to verbalize anxieties, fears, and concerns, and demonstrate effective coping: Assist patient/family to identify coping skills, available support systems and cultural and spiritual values  Note: Patient reports \"deep breathing and thinking about my support groups\" coping skills. Patient is attending therapeutic groups to gain insight on mental illness and learn positive coping skills. Problem: Pain  Goal: Verbalizes/displays adequate comfort level or baseline comfort level  12/25/2022 1020 by Ansley Almendarez RN  Outcome: Progressing  Flowsheets (Taken 12/25/2022 0730)  Verbalizes/displays adequate comfort level or baseline comfort level:   Encourage patient to monitor pain and request assistance   Assess pain using appropriate pain scale  Note: Pain Assessment: 0-10  Pain Level: 1   Patient's Stated Pain Goal: 0 - No pain   Is pain goal met at this time?   Yes     Non-Pharmaceutical Pain Intervention(s): Declines     Problem: Sleep Disturbance  Goal: Will exhibit normal sleeping pattern  Description: INTERVENTIONS:  1. Administer medication as ordered  2. Decrease environmental stimuli, including noise, as appropriate  3. Discourage social isolation and naps during the day  12/25/2022 1020 by Arnie Tinsley RN  Outcome: Progressing  Note: Patient slept 8 hours last night, reports he slept well. Encourage patient not to take naps during the day, relax several hours before bed and to take prescribed sleep meds as ordered. Patient verbalized understanding. 12/24/2022 2151 by Luis Angel Alexandra RN  Outcome: Not Progressing  Note: Patient reports broken sleep and difficulty returning to sleep last night, PRN trazodone given tonight per request     Care plan reviewed with patient. Patient verbalize understanding of the plan of care and contribute to goal setting.

## 2022-12-26 PROCEDURE — 1240000000 HC EMOTIONAL WELLNESS R&B

## 2022-12-26 PROCEDURE — 6370000000 HC RX 637 (ALT 250 FOR IP): Performed by: PSYCHIATRY & NEUROLOGY

## 2022-12-26 RX ADMIN — TRAZODONE HYDROCHLORIDE 50 MG: 50 TABLET ORAL at 21:37

## 2022-12-26 RX ADMIN — ESCITALOPRAM OXALATE 5 MG: 10 TABLET ORAL at 08:48

## 2022-12-26 ASSESSMENT — PAIN SCALES - GENERAL
PAINLEVEL_OUTOF10: 1
PAINLEVEL_OUTOF10: 0

## 2022-12-26 ASSESSMENT — PAIN DESCRIPTION - ONSET: ONSET: ON-GOING

## 2022-12-26 ASSESSMENT — PAIN DESCRIPTION - ORIENTATION: ORIENTATION: LOWER

## 2022-12-26 ASSESSMENT — PAIN DESCRIPTION - FREQUENCY: FREQUENCY: INTERMITTENT

## 2022-12-26 ASSESSMENT — PAIN DESCRIPTION - DESCRIPTORS: DESCRIPTORS: DISCOMFORT

## 2022-12-26 ASSESSMENT — PAIN DESCRIPTION - PAIN TYPE: TYPE: ACUTE PAIN

## 2022-12-26 ASSESSMENT — PAIN DESCRIPTION - LOCATION: LOCATION: BACK

## 2022-12-26 ASSESSMENT — PAIN - FUNCTIONAL ASSESSMENT: PAIN_FUNCTIONAL_ASSESSMENT: ACTIVITIES ARE NOT PREVENTED

## 2022-12-26 NOTE — PLAN OF CARE
Problem: Self Harm/Suicidality  Goal: Will have no self-injury during hospital stay  Description: INTERVENTIONS:  1. Ensure constant observer at bedside with Q15M safety checks  2. Maintain a safe environment  3. Secure patient belongings  4. Ensure family/visitors adhere to safety recommendations  5. Ensure safety tray has been added to patient's diet order  6. Every shift and PRN: Re-assess suicidal risk via Frequent Screener    Outcome: Progressing  Flowsheets (Taken 12/26/2022 0915)  Will have no self-injury during hospital stay: Maintain a safe environment  Note: No self harm behaviors were observed or reported so far this shift. Remains on every 15 minutes precautions for safety. Patient denies suicidal ideations at present time       Problem: Depression  Goal: Will be euthymic at discharge  Description: INTERVENTIONS:  1. Administer medication as ordered  2. Provide emotional support via 1:1 interaction with staff  3. Encourage involvement in milieu/groups/activities  4. Monitor for social isolation  Outcome: Progressing  Note: Patient reports mood \"6-7\"/10 with 10 being normal. Has bright affect. Speech clear. Good eye contact. Reports some hope for future and identifies friend and sister as their support system. Patient reports \"none to low\" depression at present time. Problem: Behavior  Goal: Pt/Family maintain appropriate behavior and adhere to behavioral management agreement, if implemented  Description: INTERVENTIONS:  1. Assess patient/family's coping skills and  non-compliant behavior (including use of illegal substances)  2. Notify security of behavior or suspected illegal substances which indicate the need for search of the family and/or belongings  3. Encourage verbalization of thoughts and concerns in a socially appropriate manner  4. Utilize positive, consistent limit setting strategies supporting safety of patient, staff and others  5.  Encourage participation in the decision making process about the behavioral management agreement  6. If a visitor's behavior poses a threat to safety call refer to organization policy. 7. Initiate consult with , Psychosocial CNS, Spiritual Care as appropriate  Outcome: Progressing  Flowsheets (Taken 12/26/2022 0915)  Patient/family maintains appropriate behavior and adheres to behavioral management agreement, if implemented: Encourage verbalization of thoughts and concerns in a socially appropriate manner  Note: Patient calm and cooperative with staff so far this shift. Problem: Anxiety  Goal: Will report anxiety at manageable levels  Description: INTERVENTIONS:  1. Administer medication as ordered  2. Teach and rehearse alternative coping skills  3. Provide emotional support with 1:1 interaction with staff  Outcome: Progressing  Flowsheets (Taken 12/26/2022 0915)  Will report anxiety at manageable levels: Teach and rehearse alternative coping skills  Note: Patient denies anxiety so far this shift. Problem: Drug Abuse/Detox  Goal: Will have no detox symptoms and will verbalize plan for changing drug-related behavior  Description: INTERVENTIONS:  1. Administer medication as ordered  2. Monitor physical status  3. Provide emotional support with 1:1 interaction with staff  4. Encourage  recovery focused treatment   Recent Flowsheet Documentation  Taken 12/26/2022 0915 by Trang Tavarez RN  Will have no detox symptoms and will verbalize plan for changing drug-related behavior: Monitor physical status     Problem: Sleep Disturbance  Goal: Will exhibit normal sleeping pattern  Description: INTERVENTIONS:  1. Administer medication as ordered  2. Decrease environmental stimuli, including noise, as appropriate  3. Discourage social isolation and naps during the day  Outcome: Progressing  Note: Patient slept 8 hours last night, reports he slept well.   Encourage patient not to take naps during the day, relax several hours before bed and to take prescribed sleep meds as ordered. Patient verbalized understanding. Problem: Involuntary Admit  Goal: Will cooperate with staff recommendations and doctor's orders and will demonstrate appropriate behavior  Description: INTERVENTIONS:  1. Treat underlying conditions and offer medication as ordered  2. Educate regarding involuntary admission procedures and rules  3. Contain excessive/inappropriate behavior per unit and hospital policies  Outcome: Progressing  Flowsheets (Taken 12/26/2022 0915)  Will cooperate with staff recommendations and doctor's orders and will demonstrate appropriate behavior: Treat underlying conditions and offer medication as ordered     Problem: Discharge Planning  Goal: Discharge to home or other facility with appropriate resources  Outcome: Progressing  Flowsheets (Taken 12/26/2022 0915)  Discharge to home or other facility with appropriate resources: Identify barriers to discharge with patient and caregiver  Note: Patient voices no needs before discharge. Patient plans to be discharged to home alone. Discharge planner working with patient to achieve optimal discharge plans, specific to individual needs. Problem: Risk for Elopement  Goal: Patient will not exit the unit/facility without proper excort  Outcome: Progressing  Flowsheets (Taken 12/26/2022 0915)  Nursing Interventions for Elopement Risk: Make sure patient has all necessary personal care items  Note: Patient has not been observed to be checking the exit doors or demonstrating behaviors of attempting to leave the unit. Problem: Coping  Goal: Pt/Family able to verbalize concerns and demonstrate effective coping strategies  Description: INTERVENTIONS:  1. Assist patient/family to identify coping skills, available support systems and cultural and spiritual values  2.  Provide emotional support, including active listening and acknowledgement of concerns of patient and caregivers  3. Reduce environmental stimuli, as able  4. Instruct patient/family in relaxation techniques, as appropriate  5. Assess for spiritual pain/suffering and initiate Spiritual Care, Psychosocial Clinical Specialist consults as needed  Outcome: Progressing  Flowsheets (Taken 12/26/2022 0915)  Patient/family able to verbalize anxieties, fears, and concerns, and demonstrate effective coping: Assist patient/family to identify coping skills, available support systems and cultural and spiritual values     Problem: Pain  Goal: Verbalizes/displays adequate comfort level or baseline comfort level  Outcome: Progressing  Flowsheets (Taken 12/26/2022 0915)  Verbalizes/displays adequate comfort level or baseline comfort level: Encourage patient to monitor pain and request assistance  Note: Pain Assessment: 0-10  Pain Level: 1   Patient's Stated Pain Goal: 0 - No pain   Is pain goal met at this time? NA   Patient reports lower back pain, denies the need for PRN medications this morning. Non-Pharmaceutical Pain Intervention(s): Declines     Care plan reviewed with patient. Patient verbalize understanding of the plan of care and contribute to goal setting.

## 2022-12-26 NOTE — PLAN OF CARE
Problem: Self Harm/Suicidality  Goal: Will have no self-injury during hospital stay  Description: INTERVENTIONS:  1. Ensure constant observer at bedside with Q15M safety checks  2. Maintain a safe environment  3. Secure patient belongings  4. Ensure family/visitors adhere to safety recommendations  5. Ensure safety tray has been added to patient's diet order  6. Every shift and PRN: Re-assess suicidal risk via Frequent Screener    12/25/2022 2219 by Daniel Mayo RN  Outcome: Progressing  Flowsheets (Taken 12/25/2022 2219)  Will have no self-injury during hospital stay: Maintain a safe environment  Note: No self harm noted so far this shift. Patient denies any suicidal thoughts at present. 12/25/2022 1020 by Lolly Valles RN  Outcome: Progressing  Flowsheets (Taken 12/25/2022 0730)  Will have no self-injury during hospital stay: Maintain a safe environment  Note: No self harm behaviors were observed or reported so far this shift. Remains on every 15 minutes precautions for safety. Patient denies suicidal ideations at present time. Problem: Depression  Goal: Will be euthymic at discharge  Description: INTERVENTIONS:  1. Administer medication as ordered  2. Provide emotional support via 1:1 interaction with staff  3. Encourage involvement in milieu/groups/activities  4. Monitor for social isolation  12/25/2022 2219 by Daniel Mayo RN  Outcome: Progressing  Note: Patient denies any feelings of depression at present. Patient noted with a bright affect. 12/25/2022 1020 by Lolly Valles RN  Outcome: Progressing  Note: Patient reports mood \"6-7\"/10 with 10 being normal. Has bright affect. Speech clear. Good eye contact. Reports \"I want to things to get better\" regarding hope for future and identifies sister and friends as their support system. Patient reports \"low to none\" depression at present time.          Problem: Behavior  Goal: Pt/Family maintain appropriate behavior and adhere to behavioral management agreement, if implemented  Description: INTERVENTIONS:  1. Assess patient/family's coping skills and  non-compliant behavior (including use of illegal substances)  2. Notify security of behavior or suspected illegal substances which indicate the need for search of the family and/or belongings  3. Encourage verbalization of thoughts and concerns in a socially appropriate manner  4. Utilize positive, consistent limit setting strategies supporting safety of patient, staff and others  5. Encourage participation in the decision making process about the behavioral management agreement  6. If a visitor's behavior poses a threat to safety call refer to organization policy. 7. Initiate consult with , Psychosocial CNS, Spiritual Care as appropriate  12/25/2022 2219 by Burton Bowles RN  Outcome: Progressing  Flowsheets  Taken 12/25/2022 2219  Patient/family maintains appropriate behavior and adheres to behavioral management agreement, if implemented: Encourage verbalization of thoughts and concerns in a socially appropriate manner  Taken 12/25/2022 2211  Patient/family maintains appropriate behavior and adheres to behavioral management agreement, if implemented: Encourage verbalization of thoughts and concerns in a socially appropriate manner  Note: Patient noted with appropriate behavior and activities this shift. 12/25/2022 1020 by Jimenez Nick RN  Outcome: Progressing  Flowsheets (Taken 12/25/2022 0730)  Patient/family maintains appropriate behavior and adheres to behavioral management agreement, if implemented:   Encourage verbalization of thoughts and concerns in a socially appropriate manner   Assess patient/familys coping skills and  non-compliant behavior (including use of illegal substances)  Note: Patient cooperative with staff. Problem: Anxiety  Goal: Will report anxiety at manageable levels  Description: INTERVENTIONS:  1. Administer medication as ordered  2.  Teach and rehearse alternative coping skills  3. Provide emotional support with 1:1 interaction with staff  12/25/2022 2219 by John Stanley RN  Outcome: Progressing  Flowsheets  Taken 12/25/2022 2219  Will report anxiety at manageable levels: Teach and rehearse alternative coping skills  Taken 12/25/2022 2211  Will report anxiety at manageable levels: Teach and rehearse alternative coping skills  Note: Patient states feeling somewhat anxious at intervals. 12/25/2022 1020 by Angelica Rueda RN  Outcome: Progressing  Flowsheets (Taken 12/25/2022 0730)  Will report anxiety at manageable levels:   Teach and rehearse alternative coping skills   Provide emotional support with 1:1 interaction with staff  Note: Patient reports low anxiety, denies the need for PRN medications at present time. Problem: Drug Abuse/Detox  Goal: Will have no detox symptoms and will verbalize plan for changing drug-related behavior  Description: INTERVENTIONS:  1. Administer medication as ordered  2. Monitor physical status  3. Provide emotional support with 1:1 interaction with staff  4. Encourage  recovery focused treatment   Recent Flowsheet Documentation  Taken 12/25/2022 2211 by John Stanley RN  Will have no detox symptoms and will verbalize plan for changing drug-related behavior: Monitor physical status     Problem: Sleep Disturbance  Goal: Will exhibit normal sleeping pattern  Description: INTERVENTIONS:  1. Administer medication as ordered  2. Decrease environmental stimuli, including noise, as appropriate  3. Discourage social isolation and naps during the day  12/25/2022 2219 by John Stanley RN  Outcome: Progressing  Note: Patient states he feels his sleep pattern is improving. 12/25/2022 1020 by Angelica Rueda RN  Outcome: Progressing  Note: Patient slept 8 hours last night, reports he slept well.   Encourage patient not to take naps during the day, relax several hours before bed and to take prescribed sleep meds as ordered. Patient verbalized understanding. Problem: Involuntary Admit  Goal: Will cooperate with staff recommendations and doctor's orders and will demonstrate appropriate behavior  Description: INTERVENTIONS:  1. Treat underlying conditions and offer medication as ordered  2. Educate regarding involuntary admission procedures and rules  3. Contain excessive/inappropriate behavior per unit and hospital policies  29/95/1975 [id number] by Cecilio Matt RN  Outcome: Progressing  Flowsheets  Taken 12/25/2022 2219  Will cooperate with staff recommendations and doctor's orders and will demonstrate appropriate behavior: Treat underlying conditions and offer medication as ordered  Taken 12/25/2022 2211  Will cooperate with staff recommendations and doctor's orders and will demonstrate appropriate behavior: Treat underlying conditions and offer medication as ordered  Note: Patient was cooperative with all care and medications. 12/25/2022 1020 by Maikel Morgan RN  Outcome: Progressing  Flowsheets (Taken 12/25/2022 0730)  Will cooperate with staff recommendations and doctor's orders and will demonstrate appropriate behavior: Treat underlying conditions and offer medication as ordered     Problem: Discharge Planning  Goal: Discharge to home or other facility with appropriate resources  12/25/2022 2219 by Cecilio Matt RN  Outcome: Progressing  Flowsheets  Taken 12/25/2022 2219  Discharge to home or other facility with appropriate resources: Identify barriers to discharge with patient and caregiver  Taken 12/25/2022 2211  Discharge to home or other facility with appropriate resources: Identify barriers to discharge with patient and caregiver  Note: Patient states he will return home alone at discharge and continue to follow with [facility].   12/25/2022 1020 by Maikel Morgan RN  Outcome: Progressing  Flowsheets (Taken 12/25/2022 0730)  Discharge to home or other facility with appropriate resources: Identify barriers to discharge with patient and caregiver  Note: Patient voices no needs before discharge. Patient plans to be discharged to home alone. Discharge planner working with patient to achieve optimal discharge plans, specific to individual needs. Problem: Risk for Elopement  Goal: Patient will not exit the unit/facility without proper excort  12/25/2022 2219 by Aldo Banks RN  Outcome: Progressing  Flowsheets  Taken 12/25/2022 2219  Nursing Interventions for Elopement Risk: Make sure patient has all necessary personal care items  Taken 12/25/2022 2211  Nursing Interventions for Elopement Risk: Make sure patient has all necessary personal care items  Note: No elopement attempts noted this shift. 12/25/2022 1020 by Shirin Knox RN  Outcome: Progressing  Flowsheets (Taken 12/25/2022 0730)  Nursing Interventions for Elopement Risk: Make sure patient has all necessary personal care items  Note: Patient has not been observed to be checking the exit doors or demonstrating behaviors of attempting to leave the unit. Problem: Coping  Goal: Pt/Family able to verbalize concerns and demonstrate effective coping strategies  Description: INTERVENTIONS:  1. Assist patient/family to identify coping skills, available support systems and cultural and spiritual values  2. Provide emotional support, including active listening and acknowledgement of concerns of patient and caregivers  3. Reduce environmental stimuli, as able  4. Instruct patient/family in relaxation techniques, as appropriate  5.  Assess for spiritual pain/suffering and initiate Spiritual Care, Psychosocial Clinical Specialist consults as needed  12/25/2022 2219 by Aldo Banks RN  Outcome: Progressing  Flowsheets (Taken 12/25/2022 2211)  Patient/family able to verbalize anxieties, fears, and concerns, and demonstrate effective coping: Assist patient/family to identify coping skills, available support systems and cultural and spiritual values  Note: Patient was able to verbalize needs and concerns appropriately. 12/25/2022 1020 by Arnie Tinsley RN  Outcome: Progressing  Flowsheets (Taken 12/25/2022 0730)  Patient/family able to verbalize anxieties, fears, and concerns, and demonstrate effective coping: Assist patient/family to identify coping skills, available support systems and cultural and spiritual values  Note: Patient reports \"deep breathing and thinking about my support groups\" coping skills. Patient is attending therapeutic groups to gain insight on mental illness and learn positive coping skills. Problem: Pain  Goal: Verbalizes/displays adequate comfort level or baseline comfort level  12/25/2022 2219 by Elena Manrique RN  Outcome: Progressing  Flowsheets (Taken 12/25/2022 2219)  Verbalizes/displays adequate comfort level or baseline comfort level: Encourage patient to monitor pain and request assistance  Note: Patient states current low back pain on a 1. Patient denied the need for medication. 12/25/2022 1020 by Arnie Tinsley RN  Outcome: Progressing  Flowsheets (Taken 12/25/2022 0730)  Verbalizes/displays adequate comfort level or baseline comfort level:   Encourage patient to monitor pain and request assistance   Assess pain using appropriate pain scale  Note: Pain Assessment: 0-10  Pain Level: 1   Patient's Stated Pain Goal: 0 - No pain   Is pain goal met at this time? Yes     Non-Pharmaceutical Pain Intervention(s): Declines     Care plan reviewed with patient.   Patient does verbalize understanding of the plan of care and does contribute to goal setting

## 2022-12-26 NOTE — PROGRESS NOTES
Department of Psychiatry  Progress Note     Chief Complaint:  Suicidal ideation with plan to shoot self    PROGRESS:  Giovanni is seen resting in bed. He sits up and is cooperative with the interview. He brightens upon approach. He reports he is feeling alright today but mentions he is a little drowsy. Reports his weekend was alright. Yesterday, he spent time writing down the things that he wants to work on in a step-by-step manner. These include his physical and mental health, going to school, and working on his finances. He states he had a good talk with Sakina Amanda yesterday and said some long-term goals for himself. Jefe Darlinglizbeth reports his mood has improved substantially over the last few days. He also reports his suicidal thoughts have improved substantially and denies any active suicidal ideation at this time. He is able to contract for safety on the unit. He is feeling less hopeless and helpless about his situation. He appears to be very motivated with achieving goals he has set for himself. He reports he slept pretty good last night but not as good as the night prior. Staff reported he slept 8 hours continuous last night. He has been eating well on the unit. He has been compliant with his Lexapro. He denies any side effects at this time. He has been out on the unit interacting with peers and attending groups. He does report his sister came and visited him yesterday which went well. They spent time playing cards. He states his 2 friends are planning on visiting him today. He is looking forward to this.     Suicidal ideations: Denies active suicidal ideation compliance with medications: good   Medication side effects: No   ROS: Patient has new complaints:  no  Sleep quality: 8 hours continuous last night per staff  Attending groups: Yes      OBJECTIVE      Medications  Current Facility-Administered Medications: acetaminophen (TYLENOL) tablet 650 mg, 650 mg, Oral, Q4H PRN  ibuprofen (ADVIL;MOTRIN) tablet 400 mg, 400 mg, Oral, Q6H PRN  hydrOXYzine HCl (ATARAX) tablet 50 mg, 50 mg, Oral, TID PRN  traZODone (DESYREL) tablet 50 mg, 50 mg, Oral, Nightly PRN  magnesium hydroxide (MILK OF MAGNESIA) 400 MG/5ML suspension 30 mL, 30 mL, Oral, Daily PRN  aluminum & magnesium hydroxide-simethicone (MAALOX) 200-200-20 MG/5ML suspension 30 mL, 30 mL, Oral, Q6H PRN  nicotine (NICODERM CQ) 14 MG/24HR 1 patch, 1 patch, TransDERmal, Daily  escitalopram (LEXAPRO) tablet 5 mg, 5 mg, Oral, Daily     Physical     height is 5' 10\" (1.778 m) and weight is 274 lb (124.3 kg). His tympanic temperature is 97.4 °F (36.3 °C). His blood pressure is 144/91 (abnormal) and his pulse is 65. His respiration is 16 and oxygen saturation is 100%.    No results found for: WBC, HGB, HCT, PLT, CHOL, TRIG, HDL, LDLDIRECT, ALT, AST, NA, K, CL, CREATININE, BUN, CO2, TSH, PSA, INR, GLUF, LABA1C, LABMICR       Mental Status Exam:   Level of consciousness:  awake  Appearance:  well-appearing, hospital attire, seated in bed, good grooming, and good hygiene  Behavior/Motor:  no abnormalities noted  Attitude toward examiner:  cooperative, attentive, and good eye contact  Speech:  spontaneous, normal rate, and normal volume  Mood: \"Improved substantially\" per patient  Affect: Reactive  Thought processes:  linear, goal directed, and coherent  Thought content:  Denies homicidal ideation  Suicidal Ideation: Denies active suicidal ideation  Delusions:  no evidence of delusions  Perceptual Disturbance:  denies any perceptual disturbance  Cognition: Patient is oriented to person, place, time and situation  Concentration: clinically adequate  Memory: intact  Insight & Judgement: fair       ASSESSMENT     MDD (major depressive disorder), recurrent severe, without psychosis ([facility] [address]   History of ADD  Cannabis use disorder, moderate  History of hallucinogenic abuse    PLAN    Patient's symptoms show some improvement today  Medication adjustments as discussed with the attending physician: Continue Lexapro as prescribed  Attempt to develop insight, psycho-education and supportive therapy conducted. Probable discharge: Tomorrow   follow-up: [facility] outpatient, daily while on inpatient unit    Electronically signed by Ina Yoder PA-C on 12/26/2022 at 11:28 AM Reviewed patient's current plan of care and vital signs with nursing staff. **This report has been created using voice recognition software. It may contain minor errors which are inherent in voice recognition technology. **                                      Psychiatry Attending Attestation     I assessed this patient and reviewed the case and plan of care with Ina Yoder PA-C. I have reviewed the above documentation and I agree with the findings and treatment plan with the following updates. Patient feels better than before. Mood and affect are better. Patient reports fleeting suicidal thoughts with no intent or plan. Patient notes that these thoughts are occurring less frequently. Denies any homicidal thoughts, that was explored with the patient. Oriented to time place and person. Recent and remote memory is intact. Patient feels hopeful. Sleep and appetite is good. No side effect from medication reported. Side-effect of medication were discussed with the patient . Patient is responding to current treatment. Discharge soon, if patient continues to show improvement. Case discussed with the staff. PLAN  Patient s symptoms   show no change  Continue same medication today and observe  Attempt to develop insight  Psycho-education conducted. Supportive Therapy conducted. Probable discharge is TBD   Follow-up TBD    More than 16 minutes of the session was spent doing supportive psychotherapy. Session lasted over 30 minutes today. Electronically signed by Janet Campa MD on 12/26/22 at 2:03 PM EST     An electronic signature was used to authenticate this note.      **This report has been created using voice recognition software. It may contain minor errors which are inherent in voice recognition technology. **

## 2022-12-26 NOTE — BH NOTE
Group Therapy Note    Date: 12/25/2022  Start Time: 2000  End Time:  2020  Number of Participants: 1    Type of Group: Wrap-Up/Relaxation    Wellness Binder Information  Module Name:  None  Session Number:  None    Patient's Goal:  To make a self improvement plan    Notes:  Met    Status After Intervention:  Improved    Participation Level: Interactive    Participation Quality: Appropriate      Speech:  normal      Thought Process/Content: Logical      Affective Functioning: Congruent      Mood: anxious      Level of consciousness:  Oriented x4      Response to Learning: Capable of insight      Endings: None Reported    Modes of Intervention: Education      Discipline Responsible: Registered Nurse      Signature:   Shiraz Elmore RN

## 2022-12-27 VITALS
RESPIRATION RATE: 18 BRPM | SYSTOLIC BLOOD PRESSURE: 104 MMHG | WEIGHT: 274 LBS | TEMPERATURE: 97.3 F | OXYGEN SATURATION: 94 % | DIASTOLIC BLOOD PRESSURE: 61 MMHG | HEIGHT: 70 IN | BODY MASS INDEX: 39.22 KG/M2 | HEART RATE: 61 BPM

## 2022-12-27 PROCEDURE — 6370000000 HC RX 637 (ALT 250 FOR IP): Performed by: PSYCHIATRY & NEUROLOGY

## 2022-12-27 RX ORDER — TRAZODONE HYDROCHLORIDE 50 MG/1
50 TABLET ORAL NIGHTLY PRN
Qty: 30 TABLET | Refills: 0 | Status: SHIPPED | OUTPATIENT
Start: 2022-12-27

## 2022-12-27 RX ORDER — HYDROXYZINE 50 MG/1
50 TABLET, FILM COATED ORAL 3 TIMES DAILY PRN
Qty: 30 TABLET | Refills: 0 | Status: SHIPPED | OUTPATIENT
Start: 2022-12-27 | End: 2023-01-06

## 2022-12-27 RX ORDER — ESCITALOPRAM OXALATE 5 MG/1
5 TABLET ORAL DAILY
Qty: 30 TABLET | Refills: 0 | Status: SHIPPED | OUTPATIENT
Start: 2022-12-27

## 2022-12-27 RX ADMIN — ESCITALOPRAM OXALATE 5 MG: 10 TABLET ORAL at 07:47

## 2022-12-27 ASSESSMENT — PAIN - FUNCTIONAL ASSESSMENT: PAIN_FUNCTIONAL_ASSESSMENT: ACTIVITIES ARE NOT PREVENTED

## 2022-12-27 ASSESSMENT — PAIN DESCRIPTION - PAIN TYPE: TYPE: ACUTE PAIN

## 2022-12-27 ASSESSMENT — PAIN DESCRIPTION - ONSET: ONSET: ON-GOING

## 2022-12-27 NOTE — PLAN OF CARE
Problem: Self Harm/Suicidality  Goal: Will have no self-injury during hospital stay  Description: INTERVENTIONS:  1. Ensure constant observer at bedside with Q15M safety checks  2. Maintain a safe environment  3. Secure patient belongings  4. Ensure family/visitors adhere to safety recommendations  5. Ensure safety tray has been added to patient's diet order  6. Every shift and PRN: Re-assess suicidal risk via Frequent Screener    12/26/2022 2237 by Bradley Lane LPN  Outcome: Progressing  Flowsheets (Taken 12/26/2022 0915 by Emery Campbell, RN)  Will have no self-injury during hospital stay: Maintain a safe environment  Note: Pt has had no self-injurious behaviors so far this shift, denies thoughts of self-harm and remains on every 15 minute safety checks. 12/26/2022 1306 by Emery Campbell RN  Outcome: Progressing  Flowsheets (Taken 12/26/2022 0915)  Will have no self-injury during hospital stay: Maintain a safe environment  Note: No self harm behaviors were observed or reported so far this shift. Remains on every 15 minutes precautions for safety. Patient denies suicidal ideations at present time       Problem: Depression  Goal: Will be euthymic at discharge  Description: INTERVENTIONS:  1. Administer medication as ordered  2. Provide emotional support via 1:1 interaction with staff  3. Encourage involvement in milieu/groups/activities  4. Monitor for social isolation  12/26/2022 2237 by Bradley Lane LPN  Outcome: Progressing  Note: Pt has a bright affect, reports hope for the future, denies depression and anxiety at this time. 12/26/2022 1306 by Emery Campbell RN  Outcome: Progressing  Note: Patient reports mood \"6-7\"/10 with 10 being normal. Has bright affect. Speech clear. Good eye contact. Reports some hope for future and identifies friend and sister as their support system. Patient reports \"none to low\" depression at present time.        Problem: Behavior  Goal: Pt/Family maintain appropriate behavior and adhere to behavioral management agreement, if implemented  Description: INTERVENTIONS:  1. Assess patient/family's coping skills and  non-compliant behavior (including use of illegal substances)  2. Notify security of behavior or suspected illegal substances which indicate the need for search of the family and/or belongings  3. Encourage verbalization of thoughts and concerns in a socially appropriate manner  4. Utilize positive, consistent limit setting strategies supporting safety of patient, staff and others  5. Encourage participation in the decision making process about the behavioral management agreement  6. If a visitor's behavior poses a threat to safety call refer to organization policy. 7. Initiate consult with , Psychosocial CNS, Spiritual Care as appropriate  12/26/2022 2237 by Remington Valverde LPN  Outcome: Progressing  Flowsheets (Taken 12/26/2022 0915 by Trang Tavarez, DAVIDE)  Patient/family maintains appropriate behavior and adheres to behavioral management agreement, if implemented: Encourage verbalization of thoughts and concerns in a socially appropriate manner  Note: Pt has maintained appropriate behaviors so far this shift. 12/26/2022 1306 by Trang Tavarez RN  Outcome: Progressing  Flowsheets (Taken 12/26/2022 0915)  Patient/family maintains appropriate behavior and adheres to behavioral management agreement, if implemented: Encourage verbalization of thoughts and concerns in a socially appropriate manner  Note: Patient calm and cooperative with staff so far this shift. Problem: Anxiety  Goal: Will report anxiety at manageable levels  Description: INTERVENTIONS:  1. Administer medication as ordered  2. Teach and rehearse alternative coping skills  3.  Provide emotional support with 1:1 interaction with staff  12/26/2022 2237 by Remington Valverde LPN  Outcome: Progressing  Flowsheets (Taken 12/26/2022 0915 by Trang Tavarez RN)  Will report anxiety at manageable levels: Teach and rehearse alternative coping skills  Note: No anxiety at this time, will monitor for changes. 12/26/2022 1306 by Honorio Purcell RN  Outcome: Progressing  Flowsheets (Taken 12/26/2022 0915)  Will report anxiety at manageable levels: Teach and rehearse alternative coping skills  Note: Patient denies anxiety so far this shift. Problem: Drug Abuse/Detox  Goal: Will have no detox symptoms and will verbalize plan for changing drug-related behavior  Description: INTERVENTIONS:  1. Administer medication as ordered  2. Monitor physical status  3. Provide emotional support with 1:1 interaction with staff  4. Encourage  recovery focused treatment   Recent Flowsheet Documentation  Taken 12/26/2022 0915 by Honorio Purcell RN  Will have no detox symptoms and will verbalize plan for changing drug-related behavior: Monitor physical status     Problem: Sleep Disturbance  Goal: Will exhibit normal sleeping pattern  Description: INTERVENTIONS:  1. Administer medication as ordered  2. Decrease environmental stimuli, including noise, as appropriate  3. Discourage social isolation and naps during the day  12/26/2022 2237 by Vika Aiken LPN  Outcome: Progressing  Note: Pt requested PRN trazodone for sleep, will monitor for effectiveness. 12/26/2022 1306 by Honorio Purcell RN  Outcome: Progressing  Note: Patient slept 8 hours last night, reports he slept well. Encourage patient not to take naps during the day, relax several hours before bed and to take prescribed sleep meds as ordered. Patient verbalized understanding. Problem: Involuntary Admit  Goal: Will cooperate with staff recommendations and doctor's orders and will demonstrate appropriate behavior  Description: INTERVENTIONS:  1. Treat underlying conditions and offer medication as ordered  2. Educate regarding involuntary admission procedures and rules  3.  Contain excessive/inappropriate behavior per unit and hospital policies  [id number] by Josh Ornelas LPN  Outcome: Progressing  Flowsheets (Taken 12/26/2022 0915 by Adarsh Johnson RN)  Will cooperate with staff recommendations and doctor's orders and will demonstrate appropriate behavior: Treat underlying conditions and offer medication as ordered  Note: Pt has remained cooperative and has demonstrated appropriate behaviors throughout shift. 12/26/2022 1306 by Adarsh Johnson RN  Outcome: Progressing  Flowsheets (Taken 12/26/2022 0915)  Will cooperate with staff recommendations and doctor's orders and will demonstrate appropriate behavior: Treat underlying conditions and offer medication as ordered     Problem: Discharge Planning  Goal: Discharge to home or other facility with appropriate resources  12/26/2022 2237 by Josh Ornelas LPN  Outcome: Progressing  Flowsheets (Taken 12/26/2022 0915 by Adarsh Johnson RN)  Discharge to home or other facility with appropriate resources: Identify barriers to discharge with patient and caregiver  Note: Discharge planning in place. Pt is set for probable discharge Tuesday and will follow up with [facility]. 12/26/2022 1306 by Adarsh Johnson RN  Outcome: Progressing  Flowsheets (Taken 12/26/2022 0915)  Discharge to home or other facility with appropriate resources: Identify barriers to discharge with patient and caregiver  Note: Patient voices no needs before discharge. Patient plans to be discharged to home alone. Discharge planner working with patient to achieve optimal discharge plans, specific to individual needs.        Problem: Risk for Elopement  Goal: Patient will not exit the unit/facility without proper excort  12/26/2022 2237 by Josh Ornelas LPN  Outcome: Progressing  Flowsheets (Taken 12/26/2022 0915 by Adarsh Johnson RN)  Nursing Interventions for Elopement Risk: Make sure patient has all necessary personal care items  Note: Pt has not been exit seeking so far this shift, pt has not been observed attempting to open exit doors and remains on every 15 minute safety checks. 12/26/2022 1306 by Ying Sharpe RN  Outcome: Progressing  Flowsheets (Taken 12/26/2022 0915)  Nursing Interventions for Elopement Risk: Make sure patient has all necessary personal care items  Note: Patient has not been observed to be checking the exit doors or demonstrating behaviors of attempting to leave the unit. Problem: Coping  Goal: Pt/Family able to verbalize concerns and demonstrate effective coping strategies  Description: INTERVENTIONS:  1. Assist patient/family to identify coping skills, available support systems and cultural and spiritual values  2. Provide emotional support, including active listening and acknowledgement of concerns of patient and caregivers  3. Reduce environmental stimuli, as able  4. Instruct patient/family in relaxation techniques, as appropriate  5.  Assess for spiritual pain/suffering and initiate Spiritual Care, Psychosocial Clinical Specialist consults as needed  12/26/2022 2237 by Geena Celestin LPN  Outcome: Progressing  Flowsheets (Taken 12/26/2022 0915 by Ying Sahrpe RN)  Patient/family able to verbalize anxieties, fears, and concerns, and demonstrate effective coping: Assist patient/family to identify coping skills, available support systems and cultural and spiritual values  12/26/2022 1306 by Ying Sharpe RN  Outcome: Progressing  Flowsheets (Taken 12/26/2022 0915)  Patient/family able to verbalize anxieties, fears, and concerns, and demonstrate effective coping: Assist patient/family to identify coping skills, available support systems and cultural and spiritual values     Problem: Pain  Goal: Verbalizes/displays adequate comfort level or baseline comfort level  12/26/2022 2237 by Geena Celestin LPN  Outcome: Progressing  Flowsheets (Taken 12/26/2022 0915 by Ying Sharpe RN)  Verbalizes/displays adequate comfort level or baseline comfort level: Encourage patient to monitor pain and request assistance  12/26/2022 1306 by Emery Campebll, RN  Outcome: Progressing  Flowsheets (Taken 12/26/2022 0915)  Verbalizes/displays adequate comfort level or baseline comfort level: Encourage patient to monitor pain and request assistance  Note: Pain Assessment: 0-10  Pain Level: 1   Patient's Stated Pain Goal: 0 - No pain   Is pain goal met at this time? NA   Patient reports lower back pain, denies the need for PRN medications this morning. Non-Pharmaceutical Pain Intervention(s): Declines       Care plan reviewed with patient and does verbalize understanding of the plan of care and contribute to goal setting.

## 2022-12-27 NOTE — DISCHARGE INSTRUCTIONS
Keep all follow-up appointments and take medications as directed. Call the hope line if needed at :  [facility], and [facility]. [facility] [phone]. Daniel Mendes [phone]. [facility] [phone]. [address] [facility] [phone]. Juan Salguero and [place] [phone]    Symptoms to report to your Doctor:  Depression  Inability to eat, sleep, or have a bowel movement  Increased sleepiness and lethargy  Voices in your head  Any thoughts of harming self or others    Things to avoid:  Caffeine  Alcohol  No street drugs  Over the counter medications unless Ok'd by your physician or pharmacist.  Driving or operating machinery until full effects of your medications are known. Driving or operating machinery if dizzy or drowsy from medications. Use journal as directed. Education:  Illness and medication teaching was completed. Discharge Disposition: Patient was discharged to *** and was transported by***. Patient was accompanied by***. Information sent to next level of care:    ____[facility]    _x___Discharge instructions    ____Behavioral Services Assessment    ____Hand off Summary    ____History and Physical    ____Last dose MAR    ____Patient transfer form    ____Other       [facility] Hotline:  [phone]    Crisis phone numbers:  Replaced by [facility], and [facility]. [facility] [phone]. Meño Paulino and [facility] [address] [facility] [phone]. [facility] [phone]. [address] Prasanth Harrell [phone].     [facility]  [address] [facility], 100 [facility]  1307 [facility]  [address] [facility]  [facility] 166  [facility] 57  [facility], 40 [facility] [address] [place] [address]  [address]  [address] [facility] 2210 [facility], 119 e [facility]  [address] [place]  Recovery and [facility]  [address] [facility]  [phone]    [facility]  [address]  [address] [facility],[address], [address]  [address] [facility] [address]  [place], 199 [facility]  [address]  [address], 1000 [facility]  Recovery and [facility]  [address] [place]  [phone]    [facility]  [address] Josh Ramirez [address], [address]  [address]  [address] [place]   David Cox 799  [address] [facility]  [facility]  [place], [address]  Isabel Person 180  [address]  [facility] 163   [place], [address] [facility] [address]  [phone]

## 2022-12-27 NOTE — PROGRESS NOTES
This RN has reviewed and agrees with LINUS Prince LPN's data collection and has collaborated with this LPN regarding the patient's care plan.

## 2022-12-27 NOTE — PROGRESS NOTES
Request from physician to link patient with [facility] for a neuropsych consult. Office called and referral made. Case will be reviewed and if approved the patient will be contacted post discharge for scheduling.

## 2022-12-27 NOTE — BH NOTE
Group Therapy Note    Date: 12/26/2022  Start Time: 2000  End Time:  2020      Type of Group: Wrap-Up      Patient's Goal:  no goal          Participation Level:  Active Listener and Interactive    Participation Quality: Appropriate      Speech:  normal      Thought Process/Content: Logical      Affective Functioning: Congruent      Mood: euphoric      Level of consciousness:  Alert and Oriented x4      Response to Learning: Able to verbalize current knowledge/experience, Able to change behavior, and Progressing to goal            Discipline Responsible: Licensed Practical Nurse      Signature:  Socorro Harman LPN

## 2022-12-28 ENCOUNTER — TELEPHONE (OUTPATIENT)
Dept: PSYCHIATRY | Age: 27
End: 2022-12-28
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
## 2023-01-26 RX ORDER — ESCITALOPRAM OXALATE 5 MG/1
5 TABLET ORAL DAILY
Qty: 30 TABLET | Refills: 0 | Status: SHIPPED | OUTPATIENT
Start: 2023-01-26

## 2023-01-26 NOTE — PROGRESS NOTES
Giovanni called the unit today and spoke with [facility] RN. Giovanni reported that he just saw his therapist at [facility] and his therapist did not know when he was going to seeing a provider at [facility] for medication management. Giovanni mentioned that he only had 1 day of his Lexapro left.     Will send a 30-day supply, 30 tablets of Lexapro 5 mg daily to the [facility] in [facility]